# Patient Record
Sex: FEMALE | Race: WHITE | NOT HISPANIC OR LATINO | Employment: UNEMPLOYED | URBAN - METROPOLITAN AREA
[De-identification: names, ages, dates, MRNs, and addresses within clinical notes are randomized per-mention and may not be internally consistent; named-entity substitution may affect disease eponyms.]

---

## 2020-09-02 LAB
EXTERNAL CHLAMYDIA RESULT: NOT DETECTED
EXTERNAL HIV SCREEN: NORMAL
HCV AB SER-ACNC: NON REACTIVE
N GONORRHOEA RRNA SPEC QL PROBE: NOT DETECTED

## 2021-03-29 ENCOUNTER — APPOINTMENT (EMERGENCY)
Dept: RADIOLOGY | Facility: HOSPITAL | Age: 31
End: 2021-03-29
Payer: COMMERCIAL

## 2021-03-29 ENCOUNTER — HOSPITAL ENCOUNTER (EMERGENCY)
Facility: HOSPITAL | Age: 31
Discharge: HOME | End: 2021-03-29
Attending: EMERGENCY MEDICINE | Admitting: EMERGENCY MEDICINE
Payer: COMMERCIAL

## 2021-03-29 VITALS
DIASTOLIC BLOOD PRESSURE: 73 MMHG | HEART RATE: 86 BPM | SYSTOLIC BLOOD PRESSURE: 122 MMHG | OXYGEN SATURATION: 99 % | TEMPERATURE: 99 F | RESPIRATION RATE: 20 BRPM

## 2021-03-29 DIAGNOSIS — U07.1 ACUTE BRONCHITIS DUE TO COVID-19 VIRUS: Primary | ICD-10-CM

## 2021-03-29 DIAGNOSIS — J20.8 ACUTE BRONCHITIS DUE TO COVID-19 VIRUS: Primary | ICD-10-CM

## 2021-03-29 PROCEDURE — 99284 EMERGENCY DEPT VISIT MOD MDM: CPT

## 2021-03-29 PROCEDURE — 99282 EMERGENCY DEPT VISIT SF MDM: CPT | Performed by: EMERGENCY MEDICINE

## 2021-03-30 NOTE — ED PROVIDER NOTES
nHistory  Chief Complaint   Patient presents with    Shortness of Breath     pt here from home c/o cough and SOB  Pt tested COVID + on 03/26/2021  Pt has a dx of asthma and state she feels more SOB when going up stairs  History provided by:  Patient and spouse  MARCIEBOBBY  Presenting symptoms: congestion, cough and sore throat    Presenting symptoms: no fever    Severity:  Moderate  Onset quality:  Gradual  Duration:  5 days  Timing:  Constant  Progression:  Unchanged  Chronicity:  New  Relieved by:  None tried  Worsened by:  Nothing  Ineffective treatments:  None tried  Associated symptoms: no headaches, no neck pain and no wheezing        None       Past Medical History:   Diagnosis Date    Asthma        History reviewed  No pertinent surgical history  History reviewed  No pertinent family history  I have reviewed and agree with the history as documented  E-Cigarette/Vaping     E-Cigarette/Vaping Substances     Social History     Tobacco Use    Smoking status: Never Smoker    Smokeless tobacco: Never Used   Substance Use Topics    Alcohol use: Yes     Comment: socially    Drug use: Not Currently       Review of Systems   Constitutional: Negative for activity change, chills, diaphoresis and fever  HENT: Positive for congestion and sore throat  Negative for sinus pressure  Eyes: Negative for pain and visual disturbance  Respiratory: Positive for cough  Negative for chest tightness, shortness of breath, wheezing and stridor  Cardiovascular: Negative for chest pain and palpitations  Gastrointestinal: Negative for abdominal distention, abdominal pain, constipation, diarrhea, nausea and vomiting  Genitourinary: Negative for dysuria and frequency  Musculoskeletal: Negative for neck pain and neck stiffness  Skin: Negative for rash  Neurological: Negative for dizziness, speech difficulty, light-headedness, numbness and headaches  Physical Exam  Physical Exam  Vitals signs reviewed  Constitutional:       General: She is not in acute distress  Appearance: She is well-developed  She is not diaphoretic  HENT:      Head: Normocephalic and atraumatic  Right Ear: External ear normal       Left Ear: External ear normal       Nose: Nose normal    Eyes:      General:         Right eye: No discharge  Left eye: No discharge  Pupils: Pupils are equal, round, and reactive to light  Neck:      Musculoskeletal: Normal range of motion and neck supple  Trachea: No tracheal deviation  Cardiovascular:      Rate and Rhythm: Normal rate and regular rhythm  Heart sounds: Normal heart sounds  No murmur  Pulmonary:      Effort: Pulmonary effort is normal  No respiratory distress  Breath sounds: Normal breath sounds  No stridor  Abdominal:      General: There is no distension  Palpations: Abdomen is soft  Tenderness: There is no abdominal tenderness  There is no guarding or rebound  Musculoskeletal: Normal range of motion  Skin:     General: Skin is warm and dry  Coloration: Skin is not pale  Findings: No erythema  Neurological:      General: No focal deficit present  Mental Status: She is alert and oriented to person, place, and time           Vital Signs  ED Triage Vitals [03/29/21 2218]   Temperature Pulse Respirations Blood Pressure SpO2   99 °F (37 2 °C) 86 20 122/73 99 %      Temp Source Heart Rate Source Patient Position - Orthostatic VS BP Location FiO2 (%)   Oral Monitor Sitting Left arm --      Pain Score       --           Vitals:    03/29/21 2218   BP: 122/73   Pulse: 86   Patient Position - Orthostatic VS: Sitting         Visual Acuity      ED Medications  Medications - No data to display    Diagnostic Studies  Results Reviewed     None                 No orders to display              Procedures  Procedures         ED Course                             SBIRT 20yo+      Most Recent Value   SBIRT (22 yo +)   In order to provide better care to our patients, we are screening all of our patients for alcohol and drug use  Would it be okay to ask you these screening questions? No Filed at: 03/29/2021 2221                    MDM  Number of Diagnoses or Management Options  Acute bronchitis due to COVID-19 virus: new and requires workup  Diagnosis management comments: 28-year-old female, diagnosed with COVID, no respiratory distress speaking full clear sentences  , no signs for serious illness  , strict return parameters given  Will discharge  Amount and/or Complexity of Data Reviewed  Decide to obtain previous medical records or to obtain history from someone other than the patient: yes  Obtain history from someone other than the patient: yes  Review and summarize past medical records: yes        Disposition  Final diagnoses:   Acute bronchitis due to COVID-19 virus     Time reflects when diagnosis was documented in both MDM as applicable and the Disposition within this note     Time User Action Codes Description Comment    3/29/2021 10:32 PM Olivia Jo Add [U07 1,  J20 8] Acute bronchitis due to COVID-19 virus       ED Disposition     ED Disposition Condition Date/Time Comment    Discharge Stable Mon Mar 29, 2021 10:32 PM Flavio Ayala discharge to home/self care  Follow-up Information    None         There are no discharge medications for this patient  No discharge procedures on file      PDMP Review     None          ED Provider  Electronically Signed by           Luís Frias DO  03/29/21 5946

## 2021-06-30 ENCOUNTER — TELEMEDICINE (OUTPATIENT)
Dept: OBGYN CLINIC | Facility: CLINIC | Age: 31
End: 2021-06-30

## 2021-06-30 VITALS — WEIGHT: 194 LBS | HEIGHT: 65 IN | BODY MASS INDEX: 32.32 KG/M2

## 2021-06-30 DIAGNOSIS — Z31.83 ENCOUNTER FOR ASSISTED REPRODUCTIVE FERTILITY CYCLE: ICD-10-CM

## 2021-06-30 DIAGNOSIS — O09.811 ENCOUNTER FOR SUPERVISION OF PREGNANCY RESULTING FROM ASSISTED REPRODUCTIVE TECHNOLOGY IN FIRST TRIMESTER: Primary | ICD-10-CM

## 2021-06-30 DIAGNOSIS — Z87.42 HISTORY OF PCOS: ICD-10-CM

## 2021-06-30 DIAGNOSIS — O09.819 ENCOUNTER FOR SUPERVISION OF PREGNANCY RESULTING FROM ASSISTED REPRODUCTIVE TECHNOLOGY: ICD-10-CM

## 2021-06-30 PROBLEM — E66.09 CLASS 1 OBESITY DUE TO EXCESS CALORIES WITH BODY MASS INDEX (BMI) OF 32.0 TO 32.9 IN ADULT: Status: ACTIVE | Noted: 2021-06-30

## 2021-06-30 PROBLEM — E66.811 CLASS 1 OBESITY DUE TO EXCESS CALORIES WITH BODY MASS INDEX (BMI) OF 32.0 TO 32.9 IN ADULT: Status: ACTIVE | Noted: 2021-06-30

## 2021-06-30 PROBLEM — E28.2 PCOS (POLYCYSTIC OVARIAN SYNDROME): Status: ACTIVE | Noted: 2019-02-06

## 2021-06-30 PROCEDURE — NOBC: Performed by: OBSTETRICS & GYNECOLOGY

## 2021-06-30 RX ORDER — PROGESTERONE 200 MG/1
1 CAPSULE ORAL DAILY
COMMUNITY
End: 2021-10-06

## 2021-06-30 RX ORDER — MELATONIN
1000 DAILY
COMMUNITY
End: 2022-02-17

## 2021-06-30 NOTE — PROGRESS NOTES
OB INTAKE INTERVIEW  Patient is 30 y o y o  who presents for OB intake at 10s4d wks  She is accompanied by: Sarahradhamark Christnia  The father of her baby Temi Agustin  involved in the pregnancy and is  years old    Last Menstrual Period: December  Ultrasound: Measured  weeks 8w 5days on 21  Estimated Date of Delivery:  2022 calculated by US done on  (Reproductive Medicine)  Signs/Symptoms of Pregnancy  Current pregnancy symptoms: Mild fatigue  Constipation no  Headaches no  Cramping/spotting no  PICA cravings no     Diabetes-  Body mass index is 32 28 kg/m²  If patient has 1 or more, please order early 1 hour GTT  History of GDM no  BMI >35 no  History of PCOS or current metformin use Yes  History of LGA/macrosomic infant (4000g/9lbs) no    If patient has 2 or more, please order early 1 hour GTT  BMI>30 Yes  AMA no  First degree relative with type 2 diabetes no  History of chronic HTN, hyperlipidemia, elevated A1C no  High risk race (, , ,  or ) no    Hypertension- if you answer yes, please order preeclampsia labs (cbc, comprehensive metabolic panel, urine protein creatinine ratio, uric acid)  History of of chronic HTN no  History of gestational HTN no  History of preeclampsia, eclampsia, or HELLP syndrome no  History of diabetes no  History of lupus, autoimmune disease, kidney disease no    Thyroid- if yes order TSH with reflex T4  History of thyroid disease no    Bleeding Disorder or Hx of DVT-patient or first degree relative with history of  Order the following if not done previously  (Factor V, antithrombin III, prothrombin gene mutation, protein C and S Ag, lupus anticoagulant, anticardiolipin, beta-2 glycoprotein)   no    OB/GYN-  History of abnormal pap smear no  History of HPV no  History of Herpes/HSV YES  Last episode 1 year ago  Has script for Valtrex  Aware will need to take prophylacticly  piror to delivery    History of other STI (gonorrhea, chlamydia, trich) Genital warts  History of prior  no  History of prior  no  History of  delivery prior to 36 weeks 6 days no  History of blood transfusion no  Ok for blood transfusion YES    Substance screening- if yes outside of tobacco for her or anyone in her home-order urine drug screen  History of tobacco use no  Currently using tobacco no  Currently using alcohol no  Presently using drugs no  Past drug use  no  IV drug use-If yes add Hep C antibody to labs no  Partner drug use no  Parent/Family drug use no    MRSA Screening-   Does the pt have a hx of MRSA? no  If yes- please follow MRSA protocol and obtain a nasal swab for MRSA culture    Immunizations:  Influenza vaccine given this season Yes  Discussed Tdap vaccine Yes  Discussed COVID Vaccine Yes  Patient declines at this time  Genetic/MFM-  Do you or your partner have a history of any of the following in yourselves or first degree relatives? Cystic fibrosis no  Spinal muscular atrophy no  Hemoglobinopathy/Sickle Cell/Thalassemia no  Fragile X Intellectual Disability no    If yes, discuss carrier screening and recommend consultation with MFM/genetic counseling  If no, discuss option for carrier screening and/or genetic testing with Nuchal Ultrasound  Patient interested Yes  Appointment at [place] [phone] made Patient to call    Interview education  [facility] Pregnancy Essentials Book reviewed and discussed Yes  Pregnancy 11 to 14 weeks included in AVS     Nurse/Family Partnership- patient may qualify No; referral placed No    Prenatal lab work scripts Yes  Extra labs ordered:  No    The patient has a history now or in prior pregnancy notable for: Pregnancy result of Reproductive assistance  (Medications)        Details that I feel the provider should be aware of: Pregnancy results of reproductive assistance  Pregnancy was planned and welcomed by Augustus Jones and her  Roslyn Bill   Patient states she has PCOS which was overlooked for 10 years  Pregnancy is a result of reproductive assistance  (Zonal F, Menopure,Letrozole and Leuprolide)  Patient and  are excited  Feeling great  Has two trips planned, [place] and [facility]  Advised to get up and move legs periodically and empty bladder while flying, stay hydrated  Verbalizes understanding  PN1 visit scheduled  The patient was oriented to our practice, reviewed delivering physicians and [facility]  Reviewed restrictions in place for Covid  Encouraged use of My Chart however advised not to use for urgent pregnancy concerns  This was a telephone intake which lasted approximately 60 minutes  All questions were answered  To receive Ob-packet at next visit      Interviewed by: Michael Caballero

## 2021-06-30 NOTE — PATIENT INSTRUCTIONS
Pregnancy at [address] to [address]:   You are now at the end of your first trimester and entering your second trimester  Morning sickness usually goes away by this time  You may have other symptoms such as fatigue, frequent urination, and headaches  You may have gained 2 to 4 pounds by now  DISCHARGE INSTRUCTIONS:   Return to the emergency department if:   · You have pain or cramping in your abdomen or low back  · You have heavy vaginal bleeding or clotting  · You pass material that looks like tissue or large clots  Collect the material and bring it with you  Call your doctor or obstetrician if:   · You cannot keep food or drinks down, and you are losing weight  · You have light bleeding  · You have chills or a fever  · You have vaginal itching, burning, or pain  · You have yellow, green, white, or foul-smelling vaginal discharge  · You have pain or burning when you urinate, less urine than usual, or pink or bloody urine  · You have questions or concerns about your condition or care  How to care for yourself at this stage of your pregnancy:   · Get plenty of rest   You may feel more tired than normal  You may need to take naps or go to bed earlier  · Manage nausea and vomiting  Avoid fatty and spicy foods  Eat small meals throughout the day instead of large meals  Steph may help to decrease nausea  Ask your healthcare provider about other ways of decreasing nausea and vomiting  · Eat a variety of healthy foods  Healthy foods include fruits, vegetables, whole-grain breads, low-fat dairy foods, beans, lean meats, and fish  Drink liquids as directed  Ask how much liquid to drink each day and which liquids are best for you  Limit caffeine to less than 200 milligrams each day  Limit your intake of fish to 2 servings each week  Choose fish low in mercury such as canned light tuna, shrimp, salmon, cod, or tilapia   Do not  eat fish high in mercury such as swordfish, tilefish, robbin mackerel, and shark  · Take prenatal vitamins as directed  Your need for certain vitamins and minerals, such as folic acid, increases during pregnancy  Prenatal vitamins provide some of the extra vitamins and minerals you need  Prenatal vitamins may also help to decrease the risk of certain birth defects  · Do not smoke  Smoking increases your risk of a miscarriage and other health problems during your pregnancy  Smoking can cause your baby to be born too early or weigh less at birth  Ask your healthcare provider for information if you need help quitting  · Do not drink alcohol  Alcohol passes from your body to your baby through the placenta  It can affect your baby's brain development and cause fetal alcohol syndrome (FAS)  FAS is a group of conditions that causes mental, behavior, and growth problems  · Talk to your healthcare provider before you take any medicines  Many medicines may harm your baby if you take them when you are pregnant  Do not take any medicines, vitamins, herbs, or supplements without first talking to your healthcare provider  Never use illegal or street drugs (such as marijuana or cocaine) while you are pregnant  Safety tips during pregnancy:   · Avoid hot tubs and saunas  Do not use a hot tub or sauna while you are pregnant, especially during your first trimester  Hot tubs and saunas may raise your baby's temperature and increase the risk of birth defects  · Avoid toxoplasmosis  This is an infection caused by eating raw meat or being around infected cat feces  It can cause birth defects, miscarriages, and other problems  Wash your hands after you touch raw meat  Make sure any meat is well-cooked before you eat it  Avoid raw eggs and unpasteurized milk  Use gloves or ask someone else to clean your cat's litter box while you are pregnant  Changes happening with your baby: Your baby has fully formed fingernails and toenails   Your baby's heartbeat can now be heard  Ask your healthcare provider if you can listen to your baby's heartbeat  By week 14, your baby is over 4 inches long from the top of the head to the rump (baby's bottom)  Your baby weighs over 3 ounces  Prenatal care:  Prenatal care is a series of visits with your healthcare provider throughout your pregnancy  During the first 28 weeks of your pregnancy, you will see your healthcare provider 1 time each month  Prenatal care can help prevent problems during pregnancy and childbirth  Your healthcare provider will check your blood pressure and weight  Your baby's heart rate will also be checked  You may also need the following at some visits:  · A pelvic exam  allows your healthcare provider to see your cervix (the bottom part of your uterus)  Your healthcare provider will use a speculum to open your vagina  He or she will check the size and shape of your uterus  · Blood tests  may be done to check for any of the following:     ? Gestational diabetes or anemia (low iron level)    ? Blood type or Rh factor, or certain birth defects    ? Immunity to certain diseases, such as chickenpox or rubella    ? An infection, such as a sexually transmitted infection, HIV, or hepatitis B    · Hepatitis B  may need to be prevented or treated  Hepatitis B is inflammation of the liver caused by the hepatitis B virus (HBV)  HBV can spread from a mother to her baby during delivery  You will be checked for HBV as early as possible in the first trimester of each pregnancy  You need the test even if you received the hepatitis B vaccine or were tested before  You may need to have an HBV infection treated before you give birth  · Urine tests  may also be done to check for sugar and protein  These can be signs of gestational diabetes or preeclampsia  Urine tests may also be done to check for signs of infection  · A fetal ultrasound  shows pictures of your baby inside your uterus   The pictures are used to check your baby's development, movement, and position  · Genetic disorder screening tests  may be offered to you  These tests check your baby's risk for genetic disorders such as Down syndrome  A screening test includes a blood test and ultrasound  Follow up with your doctor or obstetrician as directed:  Go to all prenatal visits  Write down your questions so you remember to ask them during your visits  © Copyright [address] Information is for End User's use only and may not be sold, redistributed or otherwise used for commercial purposes  All illustrations and images included in CareNotes® are the copyrighted property of [employer]  or [address] [place], [place] [address]   The above information is an  only  It is not intended as medical advice for individual conditions or treatments  Talk to your doctor, nurse or pharmacist before following any medical regimen to see if it is safe and effective for you

## 2021-07-15 ENCOUNTER — INITIAL PRENATAL (OUTPATIENT)
Dept: OBGYN CLINIC | Facility: CLINIC | Age: 31
End: 2021-07-15

## 2021-07-15 ENCOUNTER — ROUTINE PRENATAL (OUTPATIENT)
Dept: PERINATAL CARE | Facility: OTHER | Age: 31
End: 2021-07-15
Payer: COMMERCIAL

## 2021-07-15 VITALS — SYSTOLIC BLOOD PRESSURE: 110 MMHG | WEIGHT: 198.8 LBS | BODY MASS INDEX: 33.08 KG/M2 | DIASTOLIC BLOOD PRESSURE: 74 MMHG

## 2021-07-15 VITALS
WEIGHT: 199.4 LBS | BODY MASS INDEX: 33.22 KG/M2 | SYSTOLIC BLOOD PRESSURE: 114 MMHG | DIASTOLIC BLOOD PRESSURE: 77 MMHG | HEIGHT: 65 IN | HEART RATE: 73 BPM

## 2021-07-15 DIAGNOSIS — Z3A.12 12 WEEKS GESTATION OF PREGNANCY: ICD-10-CM

## 2021-07-15 DIAGNOSIS — J45.909 ASTHMA, UNSPECIFIED ASTHMA SEVERITY, UNSPECIFIED WHETHER COMPLICATED, UNSPECIFIED WHETHER PERSISTENT: ICD-10-CM

## 2021-07-15 DIAGNOSIS — Z34.01 FIRST PREGNANCY, FIRST TRIMESTER: Primary | ICD-10-CM

## 2021-07-15 DIAGNOSIS — B00.9 HERPES: ICD-10-CM

## 2021-07-15 DIAGNOSIS — Z36.82 ENCOUNTER FOR NUCHAL TRANSLUCENCY TESTING: Primary | ICD-10-CM

## 2021-07-15 DIAGNOSIS — O09.819 ENCOUNTER FOR SUPERVISION OF PREGNANCY RESULTING FROM ASSISTED REPRODUCTIVE TECHNOLOGY: ICD-10-CM

## 2021-07-15 DIAGNOSIS — E66.09 CLASS 1 OBESITY DUE TO EXCESS CALORIES WITH BODY MASS INDEX (BMI) OF 32.0 TO 32.9 IN ADULT, UNSPECIFIED WHETHER SERIOUS COMORBIDITY PRESENT: ICD-10-CM

## 2021-07-15 LAB
SL AMB  POCT GLUCOSE, UA: NORMAL
SL AMB POCT URINE PROTEIN: NORMAL

## 2021-07-15 PROCEDURE — 99241 PR OFFICE CONSULTATION NEW/ESTAB PATIENT 15 MIN: CPT | Performed by: OBSTETRICS & GYNECOLOGY

## 2021-07-15 PROCEDURE — 76813 OB US NUCHAL MEAS 1 GEST: CPT | Performed by: OBSTETRICS & GYNECOLOGY

## 2021-07-15 PROCEDURE — 87591 N.GONORRHOEAE DNA AMP PROB: CPT | Performed by: STUDENT IN AN ORGANIZED HEALTH CARE EDUCATION/TRAINING PROGRAM

## 2021-07-15 PROCEDURE — 87491 CHLMYD TRACH DNA AMP PROBE: CPT | Performed by: STUDENT IN AN ORGANIZED HEALTH CARE EDUCATION/TRAINING PROGRAM

## 2021-07-15 PROCEDURE — PNV: Performed by: STUDENT IN AN ORGANIZED HEALTH CARE EDUCATION/TRAINING PROGRAM

## 2021-07-15 RX ORDER — ASPIRIN 81 MG/1
162 TABLET ORAL DAILY
Qty: 180 TABLET | Refills: 3 | Status: SHIPPED | OUTPATIENT
Start: 2021-07-15 | End: 2021-12-28 | Stop reason: ALTCHOICE

## 2021-07-15 NOTE — ASSESSMENT & PLAN NOTE
Last outbreak approx 1 year prior  Has Valtrex at home in case of acute outbreak  Reviewed prophylaxis starting at 36 weeks, if prodromal or with lesions at time of labor would be for primary  rather than allowing

## 2021-07-15 NOTE — LETTER
July 15, 2021     Johnson Meyers, [address]    Patient: Tanvir Teresa   YOB: 1990   Date of Visit: 7/15/2021       Dear Dr Julia Espinoza: Thank you for referring Teo Eastman to me for evaluation  Below are my notes for this consultation  If you have questions, please do not hesitate to call me  I look forward to following your patient along with you  Sincerely,        Charlie Zuniga MD        CC: No Recipients  Charlie Zuniga MD  7/15/2021  8:38 AM  Sign when Signing Visit  CONSULT NOTE    Johnson Meyers MD  [address]     Thank you for referring your Tanvir Teresa for a Maternal-Fetal Medicine Consultation:  Below is my consultation  Thank you very much for requesting a consultation on this very nice patient for the indication of genetic screening  This is the patient's 1st pregnancy  She has a history of PCOS and asthma  She worked with infertility to help get pregnant and is currently on Prometrium 200 mg daily  She will stop this this week  She has an allergy to sulfa medications  She has no significant substance use or family history  A review of systems is otherwise negative  We discussed the options for genetic screening, including but not limited to first trimester screening, second trimester screening, combined first and second trimester screening, noninvasive prenatal screening (NIPS) for patients at high risk and diagnostic screening through the use of CVS and amniocentesis    We discussed the risks and benefits of each approach including the sensitivities and false positive rates as well as the difference between a screening test and a diagnostic test   At the conclusion of our discussion the patient elected noninvasive prenatal screening utilizing the MaterniT 21 plus test   The patient was given a requisition and collection kit to have this performed at the lab  The results should be available in approximately 7-10 days  The implications of obesity and pregnancy are significant  The level of obesity is directly related to the risk of adverse pregnancy outcomes including but not limited to, risk of diabetes, hypertensive disorders of pregnancy, macrosomia, intrauterine growth restriction, labor and shoulder dystocias,  section, and increased risk of stillbirth  Recommend discussing the current weight gain recommendations for women with obesity and discussing good dietary practices as well as the safety of exercise in pregnancy  I recommend the patient gain no more than 11-20 pounds throughout her entire pregnancy, increase her exercise and follow healthy dietary habits  Consider referral to a dietitian should the patient have difficulty following the aforementioned recommendations  Recommend third trimester growth ultrasounds to screen for fetal growth problems as well as ensuring the patient is appropriately screened for pregestational and gestational diabetes  Additional  surveillance is recommended starting at 36 weeks in those women with a BMI of greater than 40 given the increased risk for stillbirth  Given the patient's history of nulliparity and BMI, I recommend initiating low dose aspirin therapy  A recent meta-analysis yielded risk reductions of 24% for preeclampsia, 20% for intrauterine growth restriction, and 14% for  birth, with an absolute risk reduction of 2-5% for preeclampsia, one to 5% for intrauterine growth restriction, and 2-4% for  birth  In this study, there was no identified risk of harm to the mother or fetus but long-term evidence was somewhat limited  Given the overall safety profile and risk-benefit analysis, I recommend 162 mg of aspirin be taken Daily and discontinued at around 36 weeks gestation or 2-3 weeks prior to planned delivery     I reviewed these recommendations with the patient and answered all of her questions to apparent satisfaction  We discussed that SARS-CoV-2 vaccination is an option for eligible pregnant and lactating women  We discussed that the [place] and Ludy Garnett mRNA vaccines have not been tested in pregnant women and breastfeeding women yet but that the mRNA vaccines have no live virus and do not contain ingredients that are known to be harmful to pregnant women or to the fetus  We discussed that pregnant women are at increased risk of  more severe COVID-19 disease then the general population and that the mRNA vaccines are thought to be highly effective at preventing the disease  Animal studies of the Moderna vaccine demonstrated no significant negative effects on female fertility or fetal development  We discussed some risks of the vaccine including injection site reactions, fatigue, headache, myalgias, chills, and fever; fever can be treated with acetaminophen  ACOG and [facility] recommends that pregnant women have access to the vaccine and that the vaccine not be withheld from pregnant women  Pregnant women are encouraged to speak with their obstetric provider about their own personal choice regarding the vaccine  An [facility]-endorsed COVID-19 Vaccine in Pregnancy Decision Aid was attached to her AVS and can be located at [email or web address]       We discussed follow-up in detail and I recommend an anatomy ultrasound be scheduled for 20 weeks gestation  Thank you very much for allowing us to participate in the care of this very nice patient  Should you have any questions, please do not hesitate to contact our office  Please note, in addition to the time spent discussing the results of the ultrasound, I spent approximately 15 minutes of face-to-face time with the patient, greater than 50% of which was spent in counseling and the coordination of care for this patient      Portions of the record may have been created with voice recognition software  Occasional wrong word or "sound a like" substitutions may have occurred due to the inherent limitations of voice recognition software  Read the chart carefully and recognize, using context, where substitutions have occurred  Kishor Doyle MD  Attending Physician, [place]

## 2021-07-15 NOTE — PROGRESS NOTES
Patient presents for Pn1 visit    LMP-  LE-2022  GA-12W 5D    Urine-Neg  Last pap-4/15/19 Neg  GC Swab to be collected today  Pn1 labs NOT completed  Blue folder given at today's visit and thoroughly reviewed  No LOF, bleeding, cramping or discharge  No questions or concerns for today's visit

## 2021-07-15 NOTE — ASSESSMENT & PLAN NOTE
Pregravid BMI 32, goal for pregnancy 11-20   TWG to date 4#  Reviewed daily walks after lunch with goal 30 minutes five times per week as well as avoiding sugary drinks, snacks, and refined carbohydrates  Plan for early 1 hr, pending

## 2021-07-15 NOTE — PROGRESS NOTES
CONSULT NOTE    Arin Culp MD  [address]  [address]     Thank you for referring your Earney Severe for a Maternal-Fetal Medicine Consultation:  Below is my consultation  Thank you very much for requesting a consultation on this very nice patient for the indication of genetic screening  This is the patient's 1st pregnancy  She has a history of PCOS and asthma  She worked with infertility to help get pregnant and is currently on Prometrium 200 mg daily  She will stop this this week  She has an allergy to sulfa medications  She has no significant substance use or family history  A review of systems is otherwise negative  We discussed the options for genetic screening, including but not limited to first trimester screening, second trimester screening, combined first and second trimester screening, noninvasive prenatal screening (NIPS) for patients at high risk and diagnostic screening through the use of CVS and amniocentesis  We discussed the risks and benefits of each approach including the sensitivities and false positive rates as well as the difference between a screening test and a diagnostic test   At the conclusion of our discussion the patient elected noninvasive prenatal screening utilizing the MaterniT 21 plus test   The patient was given a requisition and collection kit to have this performed at the lab  The results should be available in approximately 7-10 days  The implications of obesity and pregnancy are significant  The level of obesity is directly related to the risk of adverse pregnancy outcomes including but not limited to, risk of diabetes, hypertensive disorders of pregnancy, macrosomia, intrauterine growth restriction, labor and shoulder dystocias,  section, and increased risk of stillbirth    Recommend discussing the current weight gain recommendations for women with obesity and discussing good dietary practices as well as the safety of exercise in pregnancy  I recommend the patient gain no more than 11-20 pounds throughout her entire pregnancy, increase her exercise and follow healthy dietary habits  Consider referral to a dietitian should the patient have difficulty following the aforementioned recommendations  Recommend third trimester growth ultrasounds to screen for fetal growth problems as well as ensuring the patient is appropriately screened for pregestational and gestational diabetes  Additional  surveillance is recommended starting at 36 weeks in those women with a BMI of greater than 40 given the increased risk for stillbirth  Given the patient's history of nulliparity and BMI, I recommend initiating low dose aspirin therapy  A recent meta-analysis yielded risk reductions of 24% for preeclampsia, 20% for intrauterine growth restriction, and 14% for  birth, with an absolute risk reduction of 2-5% for preeclampsia, one to 5% for intrauterine growth restriction, and 2-4% for  birth  In this study, there was no identified risk of harm to the mother or fetus but long-term evidence was somewhat limited  Given the overall safety profile and risk-benefit analysis, I recommend 162 mg of aspirin be taken Daily and discontinued at around 36 weeks gestation or 2-3 weeks prior to planned delivery  I reviewed these recommendations with the patient and answered all of her questions to apparent satisfaction  We discussed that SARS-CoV-2 vaccination is an option for eligible pregnant and lactating women  We discussed that the Pappas Peter and Allison Sobia mRNA vaccines have not been tested in pregnant women and breastfeeding women yet but that the mRNA vaccines have no live virus and do not contain ingredients that are known to be harmful to pregnant women or to the fetus    We discussed that pregnant women are at increased risk of  more severe COVID-19 disease then the general population and that the mRNA vaccines are thought to be highly effective at preventing the disease  Animal studies of the Moderna vaccine demonstrated no significant negative effects on female fertility or fetal development  We discussed some risks of the vaccine including injection site reactions, fatigue, headache, myalgias, chills, and fever; fever can be treated with acetaminophen  ACOG and [facility] recommends that pregnant women have access to the vaccine and that the vaccine not be withheld from pregnant women  Pregnant women are encouraged to speak with their obstetric provider about their own personal choice regarding the vaccine  An [facility]-endorsed COVID-19 Vaccine in Pregnancy Decision Aid was attached to her AVS and can be located at [email or web address]       We discussed follow-up in detail and I recommend an anatomy ultrasound be scheduled for 20 weeks gestation  Thank you very much for allowing us to participate in the care of this very nice patient  Should you have any questions, please do not hesitate to contact our office  Please note, in addition to the time spent discussing the results of the ultrasound, I spent approximately 15 minutes of face-to-face time with the patient, greater than 50% of which was spent in counseling and the coordination of care for this patient  Portions of the record may have been created with voice recognition software  Occasional wrong word or "sound a like" substitutions may have occurred due to the inherent limitations of voice recognition software  Read the chart carefully and recognize, using context, where substitutions have occurred  Kishor Genao MD  Attending Physician, [place]

## 2021-07-15 NOTE — PROGRESS NOTES
Patient chose to have [address] screen  Instructed patient on process for checking her OOP cost via [employer] [facility]  Provided [id number] instruction card toll free # [phone]  Patient made aware if [id number]  unable to give an estimate she will need to contact M office prior to blood draw  Patient aware that  is provided by third party and is only an estimate of cost not a guarantee  Insurance may require prior authorization, if test drawn without prior authorization she will be responsible for full cost of test   For definitive OOP cost, lab deductible or if lab authorization is required patient encouraged to call her insurance provider  Explained customer service insurance phone # located on the back of her ID card  Maternal Fetal Medicine will have results in approximately 7-10 business days and will call patient or notify via [address]  Patient aware viewing lab result reveals gender  **[id number] lab kit with prepaid [employer]  given to patient  Patient verbalized understanding of all instructions and no questions at this time

## 2021-07-15 NOTE — PROGRESS NOTES
INITIAL OB APPOINTMENT    Assessment/Plan   31 y o   12w5d for initial prenatal appointment  Problem List Items Addressed This Visit        Respiratory    Asthma     Has rescue inhaler in purse            Other    Class 1 obesity due to excess calories with body mass index (BMI) of 32 0 to 32 9 in adult     Pregravid BMI 32, goal for pregnancy 11-20  TWG to date 4#  Reviewed daily walks after lunch with goal 30 minutes five times per week as well as avoiding sugary drinks, snacks, and refined carbohydrates  Plan for early 1 hr, pending         Herpes     Last outbreak approx 1 year prior  Has Valtrex at home in case of acute outbreak  Reviewed prophylaxis starting at 36 weeks, if prodromal or with lesions at time of labor would be for primary  rather than allowing          First pregnancy, first trimester - Primary     Prenatal labs pending  Plan to wrap up Prometrium  To start baby ASA    Plan to attempt breast feeding, bottle if needs  Would like NuvaRing for contraception - previously did well on this         Relevant Orders    Chlamydia/GC amplified DNA by PCR    POCT urine dip (Completed)         For routine OB follow up   ------------    CC: initial prenatal appointment    History of Present Illness     Rosa Hernandez is a 31 y o  female  at 14w10d Estimated Date of Delivery: 22 by 8 5/7 week ultrasound on 2021, not consistent with LMP (No LMP recorded  Patient is pregnant  )  She presents for her initial prenatal appointment  She is accompanied by her mom  Concerns today: none, just had MFM appointment    PRENATAL ISSUES  1  BMI 32  2   Asthma  3      OB History        1    Para        Term                AB        Living           SAB        TAB        Ectopic        Multiple        Live Births                   # 1 - Date: None, Sex: None, Weight: None, GA: None, Delivery: None, Apgar1: None, Apgar5: None, Living: None, Birth Comments: None      GYN History  No LMP recorded  Patient is pregnant  Remote history abnormal Pap smears - last Pap smear 4/2019 NILM  No history of cryotherapy, LEEP, or cold knife cone of the cervix  History herpes    Past Medical History:   Diagnosis Date    Abnormal Pap smear of cervix     Asthma     albuterol as needed    Genital warts     Spouse has history    Herpes     last outbreak 1 year ago     Past Surgical History:   Procedure Laterality Date    WISDOM TOOTH EXTRACTION       No alcohol, tobacco, illicit drug use  Current Outpatient Medications:     aspirin (ECOTRIN LOW STRENGTH) 81 mg EC tablet, Take 2 tablets (162 mg total) by mouth daily, Disp: 180 tablet, Rfl: 3    cholecalciferol (VITAMIN D3) 1,000 units tablet, Take 1,000 Units by mouth daily, Disp: , Rfl:     Prenatal MV & Min w/FA-DHA (ONE A DAY PRENATAL PO), Take 1 tablet by mouth, Disp: , Rfl:     Progesterone 200 MG CAPS, Take 1 tablet by mouth daily, Disp: , Rfl:   Allergies   Allergen Reactions    Sulfa Antibiotics Hives       Objective   Vitals: Blood pressure 110/74, weight 90 2 kg (198 lb 12 8 oz)  Body mass index is 33 08 kg/m²  General: NAD, AAOx3  Neck: supple, no thyromegaly or thyroid nodules appreciated  Heart: RRR  Lungs: CTAB  Breast: nipples everted bilaterally, no skin changes  No dimpling, redness, or erythema  No breast masses or axillary masses bilaterally  Dense breasts bilaterally  Abdomen: soft, non-distended, non tender to palpation  Extremities: non-tender to palpation  Speculum exam: Normal appearing external genitalia, normal hair distribution  Urethra well-suspended, no clitoromegaly noted  Vagina pink, moist, well-rugated  Cervix without lesion, nulliparous appearing  no dilated  no blood in vaginal vault  Pelvic exam: No cervical motion tenderness  No adnexal masses or tenderness  anteverted uterus, 12 week size  Fetal heart rate 160 bpm by M mode

## 2021-07-15 NOTE — PATIENT INSTRUCTIONS
For a COVID-19 Vaccine in Pregnancy Decision Aid, go to [email or web address]    The ABM (Academy of Breastfeeding Medicine) Statement on Considerations for COVID-19 Vaccination in Lactation is available at: TravelLesson tn  Finally, the CDC statement on Vaccination Consideration for People who are Pregnant or Breastfeeding is available at:  Grant gomes      Here are the images (12/28/20) for the decision aid:

## 2021-07-15 NOTE — ASSESSMENT & PLAN NOTE
Prenatal labs pending  Plan to wrap up Prometrium  To start baby ASA    Plan to attempt breast feeding, bottle if needs  Would like NuvaRing for contraception - previously did well on this

## 2021-07-17 LAB
C TRACH DNA SPEC QL NAA+PROBE: NEGATIVE
N GONORRHOEA DNA SPEC QL NAA+PROBE: NEGATIVE

## 2021-07-21 LAB
CFDNA.FET/CFDNA.TOTAL SFR FETUS: NORMAL %
CFDNA.FET/CFDNA.TOTAL SFR FETUS: NORMAL %
CITATION REF LAB TEST: NORMAL
FET 13+18+21+X+Y ANEUP PLAS.CFDNA: NEGATIVE
FET CHR 21 TS PLAS.CFDNA QL: NEGATIVE
FET CHR 21 TS PLAS.CFDNA QL: NEGATIVE
FET MS X RISK WBC.DNA+CFDNA QL: NOT DETECTED
FET SEX PLAS.CFDNA DOSAGE CFDNA: NORMAL
FET TS 13 RISK PLAS.CFDNA QL: NEGATIVE
FET X + Y ANEUP RISK PLAS.CFDNA SEQ-IMP: NOT DETECTED
GESTATION: NORMAL
GESTATIONAL AGE > 9:: YES
LAB DIRECTOR NAME PROVIDER: NORMAL
LABORATORY COMMENT REPORT: NORMAL
LIMITATIONS OF THE TEST: NORMAL
NEGATIVE PREDICTIVE VALUE: NORMAL
PERFORMANCE CHARACTERISTICS: NORMAL
POSITIVE PREDICTIVE VALUE: NORMAL
REF LAB TEST METHOD: NORMAL
SL AMB NOTE:: NORMAL
TEST PERFORMANCE INFO SPEC: NORMAL

## 2021-07-26 NOTE — RESULT ENCOUNTER NOTE
I have reviewed the results of the NIPS which are low risk  Please call patient and notify her of reassuring results if she has not viewed on MyChart  Please ensure she is notified of recommendation of MSAFP to be ordered and followed up through her primary Obstetrician's office  Thank you

## 2021-08-13 ENCOUNTER — ROUTINE PRENATAL (OUTPATIENT)
Dept: OBGYN CLINIC | Facility: CLINIC | Age: 31
End: 2021-08-13

## 2021-08-13 VITALS — SYSTOLIC BLOOD PRESSURE: 100 MMHG | WEIGHT: 202.4 LBS | DIASTOLIC BLOOD PRESSURE: 78 MMHG | BODY MASS INDEX: 33.68 KG/M2

## 2021-08-13 DIAGNOSIS — Z33.1 PREGNANT STATE, INCIDENTAL: ICD-10-CM

## 2021-08-13 DIAGNOSIS — O09.819 ENCOUNTER FOR SUPERVISION OF PREGNANCY RESULTING FROM ASSISTED REPRODUCTIVE TECHNOLOGY: Primary | ICD-10-CM

## 2021-08-13 DIAGNOSIS — Z36.9 UNSPECIFIED ANTENATAL SCREENING: ICD-10-CM

## 2021-08-13 LAB
SL AMB  POCT GLUCOSE, UA: NORMAL
SL AMB POCT URINE PROTEIN: NORMAL

## 2021-08-13 PROCEDURE — PNV: Performed by: STUDENT IN AN ORGANIZED HEALTH CARE EDUCATION/TRAINING PROGRAM

## 2021-08-13 NOTE — PROGRESS NOTES
31 y o   at [place] [address], here for return OB visit  Feeling well overall and without concerns  Does not yet feel FM  No questions/concerns       Problem List Items Addressed This Visit     None      Visit Diagnoses     Encounter for supervision of pregnancy resulting from assisted reproductive technology    -  Primary    Relevant Orders    Alpha fetoprotein, maternal    POCT urine dip (Completed)    Type and screen    Pregnant state, incidental        Relevant Orders    Alpha fetoprotein, maternal    Unspecified  screening        Relevant Orders    Alpha fetoprotein, maternal

## 2021-08-13 NOTE — PROGRESS NOTES
Pt is here for routine PN visit  GC/CH neg  Labs completed at [place] [id number] scheduled for 9/11  Denies any VB or cramping  No LOF

## 2021-09-08 LAB
ABO GROUP BLD: NORMAL
BLD GP AB SCN SERPL QL: NEGATIVE
RH BLD: POSITIVE

## 2021-09-09 LAB
2ND TRIMESTER 4 SCREEN SERPL-IMP: NORMAL
AFP ADJ MOM SERPL: 1.48
AFP INTERP AMN-IMP: NORMAL
AFP INTERP SERPL-IMP: NORMAL
AFP INTERP SERPL-IMP: NORMAL
AFP SERPL-MCNC: 75.6 NG/ML
AGE AT DELIVERY: 29.1 YR
GA METHOD: NORMAL
GA: 20.4 WEEKS
IDDM PATIENT QL: NO
MULTIPLE PREGNANCY: NO
NEURAL TUBE DEFECT RISK FETUS: 2886 %

## 2021-09-10 ENCOUNTER — ROUTINE PRENATAL (OUTPATIENT)
Dept: PERINATAL CARE | Facility: OTHER | Age: 31
End: 2021-09-10
Payer: COMMERCIAL

## 2021-09-10 VITALS
DIASTOLIC BLOOD PRESSURE: 78 MMHG | BODY MASS INDEX: 34.99 KG/M2 | HEIGHT: 65 IN | SYSTOLIC BLOOD PRESSURE: 115 MMHG | HEART RATE: 74 BPM | WEIGHT: 210 LBS

## 2021-09-10 DIAGNOSIS — Z3A.20 20 WEEKS GESTATION OF PREGNANCY: ICD-10-CM

## 2021-09-10 DIAGNOSIS — Z36.86 ENCOUNTER FOR ANTENATAL SCREENING FOR CERVICAL LENGTH: ICD-10-CM

## 2021-09-10 DIAGNOSIS — O99.212 MATERNAL OBESITY, ANTEPARTUM, SECOND TRIMESTER: Primary | ICD-10-CM

## 2021-09-10 PROCEDURE — 76811 OB US DETAILED SNGL FETUS: CPT | Performed by: OBSTETRICS & GYNECOLOGY

## 2021-09-10 PROCEDURE — 76817 TRANSVAGINAL US OBSTETRIC: CPT | Performed by: OBSTETRICS & GYNECOLOGY

## 2021-09-10 PROCEDURE — 99213 OFFICE O/P EST LOW 20 MIN: CPT | Performed by: OBSTETRICS & GYNECOLOGY

## 2021-09-10 NOTE — LETTER
September 12, 2021     [facility]  [address]    Patient: Nadege    YOB: 1990   Date of Visit: 9/10/2021       Dear Dr Jemma Bentley: Thank you for referring Nikki Collet to me for evaluation  Below are my notes for this consultation  If you have questions, please do not hesitate to call me  I look forward to following your patient along with you  Sincerely,        Mara Church MD        CC: No Recipients  Mara Church MD  9/10/2021  6:31 AM  Sign when Signing Visit  Please refer to the [facility] ultrasound report in Ob Procedures for additional information regarding today's visit

## 2021-09-14 ENCOUNTER — ROUTINE PRENATAL (OUTPATIENT)
Dept: OBGYN CLINIC | Facility: MEDICAL CENTER | Age: 31
End: 2021-09-14

## 2021-09-14 VITALS — DIASTOLIC BLOOD PRESSURE: 72 MMHG | SYSTOLIC BLOOD PRESSURE: 118 MMHG | WEIGHT: 211.6 LBS | BODY MASS INDEX: 35.21 KG/M2

## 2021-09-14 DIAGNOSIS — Z34.02 ENCOUNTER FOR SUPERVISION OF NORMAL FIRST PREGNANCY, SECOND TRIMESTER: Primary | ICD-10-CM

## 2021-09-14 DIAGNOSIS — E66.09 CLASS 1 OBESITY DUE TO EXCESS CALORIES WITH SERIOUS COMORBIDITY AND BODY MASS INDEX (BMI) OF 32.0 TO 32.9 IN ADULT: ICD-10-CM

## 2021-09-14 LAB
SL AMB  POCT GLUCOSE, UA: NORMAL
SL AMB POCT URINE PROTEIN: NORMAL

## 2021-09-14 PROCEDURE — PNV: Performed by: STUDENT IN AN ORGANIZED HEALTH CARE EDUCATION/TRAINING PROGRAM

## 2021-09-14 RX ORDER — LORATADINE 10 MG/1
10 TABLET ORAL DAILY
COMMUNITY
End: 2021-10-06

## 2021-09-14 NOTE — PROGRESS NOTES
31 y o   at 21w3d presents for routine prenatal visit  She denies contractions/leakage of fluid/vaginal bleeding  She feels good fetal movement  Problem List Items Addressed This Visit        Other    Class 1 obesity due to excess calories with body mass index (BMI) of 32 0 to 32 9 in adult  Weight gain 17# - recommend 11-20  Discussed healthy diet, exercise  She does go to the gym        Other Visit Diagnoses     Encounter for supervision of normal first pregnancy, second trimester    -  Primary  Possible aberrant left subclavian artery - f/u echo scheduled  Growth at 32 wks  NIPT low risk  F/u in 4 wks or prn

## 2021-10-04 ENCOUNTER — TELEPHONE (OUTPATIENT)
Dept: OBGYN CLINIC | Facility: CLINIC | Age: 31
End: 2021-10-04

## 2021-10-04 PROBLEM — Z34.02 FIRST PREGNANCY, SECOND TRIMESTER: Status: ACTIVE | Noted: 2021-07-15

## 2021-10-06 ENCOUNTER — ROUTINE PRENATAL (OUTPATIENT)
Dept: OBGYN CLINIC | Facility: CLINIC | Age: 31
End: 2021-10-06

## 2021-10-06 VITALS — DIASTOLIC BLOOD PRESSURE: 72 MMHG | BODY MASS INDEX: 36.11 KG/M2 | SYSTOLIC BLOOD PRESSURE: 108 MMHG | WEIGHT: 217 LBS

## 2021-10-06 DIAGNOSIS — J45.909 ASTHMA, UNSPECIFIED ASTHMA SEVERITY, UNSPECIFIED WHETHER COMPLICATED, UNSPECIFIED WHETHER PERSISTENT: ICD-10-CM

## 2021-10-06 DIAGNOSIS — B00.9 HERPES: ICD-10-CM

## 2021-10-06 DIAGNOSIS — Z34.02 FIRST PREGNANCY, SECOND TRIMESTER: Primary | ICD-10-CM

## 2021-10-06 DIAGNOSIS — E66.09 CLASS 1 OBESITY DUE TO EXCESS CALORIES WITH BODY MASS INDEX (BMI) OF 32.0 TO 32.9 IN ADULT, UNSPECIFIED WHETHER SERIOUS COMORBIDITY PRESENT: ICD-10-CM

## 2021-10-06 PROCEDURE — PNV: Performed by: STUDENT IN AN ORGANIZED HEALTH CARE EDUCATION/TRAINING PROGRAM

## 2021-10-06 RX ORDER — VALACYCLOVIR HYDROCHLORIDE 500 MG/1
500 TABLET, FILM COATED ORAL 2 TIMES DAILY
Qty: 10 TABLET | Refills: 3 | Status: SHIPPED | OUTPATIENT
Start: 2021-10-06 | End: 2022-01-19 | Stop reason: HOSPADM

## 2021-10-12 ENCOUNTER — ROUTINE PRENATAL (OUTPATIENT)
Dept: PEDIATRIC CARDIOLOGY | Facility: CLINIC | Age: 31
End: 2021-10-12
Payer: COMMERCIAL

## 2021-10-12 VITALS
WEIGHT: 219.36 LBS | HEIGHT: 65 IN | BODY MASS INDEX: 36.55 KG/M2 | DIASTOLIC BLOOD PRESSURE: 83 MMHG | SYSTOLIC BLOOD PRESSURE: 131 MMHG | HEART RATE: 97 BPM

## 2021-10-12 DIAGNOSIS — Z36.89 NORMAL FETAL CARDIAC EXAM: Primary | ICD-10-CM

## 2021-10-12 PROCEDURE — 76825 ECHO EXAM OF FETAL HEART: CPT | Performed by: PEDIATRICS

## 2021-10-12 PROCEDURE — 93325 DOPPLER ECHO COLOR FLOW MAPG: CPT | Performed by: PEDIATRICS

## 2021-10-12 PROCEDURE — 99205 OFFICE O/P NEW HI 60 MIN: CPT | Performed by: PEDIATRICS

## 2021-10-12 PROCEDURE — 76827 ECHO EXAM OF FETAL HEART: CPT | Performed by: PEDIATRICS

## 2021-10-12 PROCEDURE — 76820 UMBILICAL ARTERY ECHO: CPT | Performed by: PEDIATRICS

## 2021-10-13 ENCOUNTER — ROUTINE PRENATAL (OUTPATIENT)
Dept: OBGYN CLINIC | Facility: CLINIC | Age: 31
End: 2021-10-13

## 2021-10-13 VITALS
HEIGHT: 65 IN | DIASTOLIC BLOOD PRESSURE: 70 MMHG | WEIGHT: 218 LBS | BODY MASS INDEX: 36.32 KG/M2 | SYSTOLIC BLOOD PRESSURE: 110 MMHG

## 2021-10-13 DIAGNOSIS — Z34.02 ENCOUNTER FOR SUPERVISION OF NORMAL FIRST PREGNANCY IN SECOND TRIMESTER: Primary | ICD-10-CM

## 2021-10-13 LAB
SL AMB  POCT GLUCOSE, UA: NEGATIVE
SL AMB POCT URINE PROTEIN: NEGATIVE

## 2021-10-13 PROCEDURE — PNV: Performed by: OBSTETRICS & GYNECOLOGY

## 2021-10-26 ENCOUNTER — APPOINTMENT (OUTPATIENT)
Dept: LAB | Facility: CLINIC | Age: 31
End: 2021-10-26
Payer: COMMERCIAL

## 2021-10-26 DIAGNOSIS — Z34.02 FIRST PREGNANCY, SECOND TRIMESTER: ICD-10-CM

## 2021-10-26 LAB
BASOPHILS # BLD AUTO: 0.04 THOUSANDS/ΜL (ref 0–0.1)
BASOPHILS NFR BLD AUTO: 0 % (ref 0–1)
EOSINOPHIL # BLD AUTO: 0.2 THOUSAND/ΜL (ref 0–0.61)
EOSINOPHIL NFR BLD AUTO: 2 % (ref 0–6)
ERYTHROCYTE [DISTWIDTH] IN BLOOD BY AUTOMATED COUNT: 13.1 % (ref 11.6–15.1)
GLUCOSE 1H P 50 G GLC PO SERPL-MCNC: 131 MG/DL (ref 40–134)
HCT VFR BLD AUTO: 34.6 % (ref 34.8–46.1)
HGB BLD-MCNC: 11.5 G/DL (ref 11.5–15.4)
IMM GRANULOCYTES # BLD AUTO: 0.12 THOUSAND/UL (ref 0–0.2)
IMM GRANULOCYTES NFR BLD AUTO: 1 % (ref 0–2)
LYMPHOCYTES # BLD AUTO: 2.64 THOUSANDS/ΜL (ref 0.6–4.47)
LYMPHOCYTES NFR BLD AUTO: 23 % (ref 14–44)
MCH RBC QN AUTO: 32.7 PG (ref 26.8–34.3)
MCHC RBC AUTO-ENTMCNC: 33.2 G/DL (ref 31.4–37.4)
MCV RBC AUTO: 98 FL (ref 82–98)
MONOCYTES # BLD AUTO: 0.74 THOUSAND/ΜL (ref 0.17–1.22)
MONOCYTES NFR BLD AUTO: 6 % (ref 4–12)
NEUTROPHILS # BLD AUTO: 7.89 THOUSANDS/ΜL (ref 1.85–7.62)
NEUTS SEG NFR BLD AUTO: 68 % (ref 43–75)
NRBC BLD AUTO-RTO: 0 /100 WBCS
PLATELET # BLD AUTO: 226 THOUSANDS/UL (ref 149–390)
PMV BLD AUTO: 11.5 FL (ref 8.9–12.7)
RBC # BLD AUTO: 3.52 MILLION/UL (ref 3.81–5.12)
RPR SER QL: NORMAL
WBC # BLD AUTO: 11.63 THOUSAND/UL (ref 4.31–10.16)

## 2021-10-26 PROCEDURE — 85025 COMPLETE CBC W/AUTO DIFF WBC: CPT

## 2021-10-26 PROCEDURE — 82950 GLUCOSE TEST: CPT

## 2021-10-26 PROCEDURE — 36415 COLL VENOUS BLD VENIPUNCTURE: CPT

## 2021-10-26 PROCEDURE — 86592 SYPHILIS TEST NON-TREP QUAL: CPT

## 2021-11-03 PROBLEM — Z34.03 FIRST PREGNANCY, THIRD TRIMESTER: Status: ACTIVE | Noted: 2021-07-15

## 2021-11-04 ENCOUNTER — ROUTINE PRENATAL (OUTPATIENT)
Dept: OBGYN CLINIC | Age: 31
End: 2021-11-04

## 2021-11-04 VITALS — WEIGHT: 224 LBS | DIASTOLIC BLOOD PRESSURE: 74 MMHG | SYSTOLIC BLOOD PRESSURE: 116 MMHG | BODY MASS INDEX: 37.28 KG/M2

## 2021-11-04 DIAGNOSIS — Z34.03 FIRST PREGNANCY, THIRD TRIMESTER: ICD-10-CM

## 2021-11-04 DIAGNOSIS — Z34.03 PRENATAL CARE, FIRST PREGNANCY, THIRD TRIMESTER: Primary | ICD-10-CM

## 2021-11-04 DIAGNOSIS — J45.909 ASTHMA, UNSPECIFIED ASTHMA SEVERITY, UNSPECIFIED WHETHER COMPLICATED, UNSPECIFIED WHETHER PERSISTENT: ICD-10-CM

## 2021-11-04 DIAGNOSIS — B00.9 HERPES: ICD-10-CM

## 2021-11-04 DIAGNOSIS — Z71.85 VACCINE COUNSELING: ICD-10-CM

## 2021-11-04 DIAGNOSIS — E66.09 CLASS 1 OBESITY DUE TO EXCESS CALORIES WITH BODY MASS INDEX (BMI) OF 32.0 TO 32.9 IN ADULT, UNSPECIFIED WHETHER SERIOUS COMORBIDITY PRESENT: ICD-10-CM

## 2021-11-04 LAB
SL AMB  POCT GLUCOSE, UA: NORMAL
SL AMB POCT URINE PROTEIN: NORMAL

## 2021-11-04 PROCEDURE — PNV: Performed by: STUDENT IN AN ORGANIZED HEALTH CARE EDUCATION/TRAINING PROGRAM

## 2021-11-17 ENCOUNTER — ROUTINE PRENATAL (OUTPATIENT)
Dept: OBGYN CLINIC | Facility: CLINIC | Age: 31
End: 2021-11-17

## 2021-11-17 VITALS
SYSTOLIC BLOOD PRESSURE: 124 MMHG | DIASTOLIC BLOOD PRESSURE: 70 MMHG | WEIGHT: 227 LBS | BODY MASS INDEX: 37.82 KG/M2 | HEIGHT: 65 IN

## 2021-11-17 DIAGNOSIS — Z34.03 ENCOUNTER FOR SUPERVISION OF NORMAL FIRST PREGNANCY IN THIRD TRIMESTER: Primary | ICD-10-CM

## 2021-11-17 LAB
SL AMB  POCT GLUCOSE, UA: NEGATIVE
SL AMB POCT URINE PROTEIN: NEGATIVE

## 2021-11-17 PROCEDURE — PNV: Performed by: OBSTETRICS & GYNECOLOGY

## 2021-11-30 ENCOUNTER — ROUTINE PRENATAL (OUTPATIENT)
Dept: OBGYN CLINIC | Age: 31
End: 2021-11-30
Payer: COMMERCIAL

## 2021-11-30 VITALS — SYSTOLIC BLOOD PRESSURE: 114 MMHG | DIASTOLIC BLOOD PRESSURE: 72 MMHG | BODY MASS INDEX: 38.01 KG/M2 | WEIGHT: 228.4 LBS

## 2021-11-30 DIAGNOSIS — Z34.03 FIRST PREGNANCY, THIRD TRIMESTER: ICD-10-CM

## 2021-11-30 DIAGNOSIS — Z34.83 ENCOUNTER FOR SUPERVISION OF OTHER NORMAL PREGNANCY IN THIRD TRIMESTER: Primary | ICD-10-CM

## 2021-11-30 DIAGNOSIS — Z23 NEED FOR TDAP VACCINATION: ICD-10-CM

## 2021-11-30 LAB
SL AMB  POCT GLUCOSE, UA: NEGATIVE
SL AMB POCT URINE PROTEIN: NEGATIVE

## 2021-11-30 PROCEDURE — 90471 IMMUNIZATION ADMIN: CPT

## 2021-11-30 PROCEDURE — PNV: Performed by: STUDENT IN AN ORGANIZED HEALTH CARE EDUCATION/TRAINING PROGRAM

## 2021-11-30 PROCEDURE — 90715 TDAP VACCINE 7 YRS/> IM: CPT

## 2021-12-03 ENCOUNTER — ULTRASOUND (OUTPATIENT)
Dept: PERINATAL CARE | Facility: OTHER | Age: 31
End: 2021-12-03
Payer: COMMERCIAL

## 2021-12-03 VITALS
HEART RATE: 78 BPM | BODY MASS INDEX: 37.99 KG/M2 | HEIGHT: 65 IN | DIASTOLIC BLOOD PRESSURE: 73 MMHG | WEIGHT: 228 LBS | SYSTOLIC BLOOD PRESSURE: 116 MMHG

## 2021-12-03 DIAGNOSIS — O99.210 OBESITY AFFECTING PREGNANCY, ANTEPARTUM: Primary | ICD-10-CM

## 2021-12-03 DIAGNOSIS — Z3A.32 32 WEEKS GESTATION OF PREGNANCY: ICD-10-CM

## 2021-12-03 PROCEDURE — 76816 OB US FOLLOW-UP PER FETUS: CPT | Performed by: OBSTETRICS & GYNECOLOGY

## 2021-12-03 PROCEDURE — 99212 OFFICE O/P EST SF 10 MIN: CPT | Performed by: OBSTETRICS & GYNECOLOGY

## 2021-12-14 ENCOUNTER — ROUTINE PRENATAL (OUTPATIENT)
Dept: OBGYN CLINIC | Age: 31
End: 2021-12-14

## 2021-12-14 VITALS — DIASTOLIC BLOOD PRESSURE: 84 MMHG | BODY MASS INDEX: 38.77 KG/M2 | SYSTOLIC BLOOD PRESSURE: 108 MMHG | WEIGHT: 233 LBS

## 2021-12-14 DIAGNOSIS — Z34.03 FIRST PREGNANCY, THIRD TRIMESTER: ICD-10-CM

## 2021-12-14 DIAGNOSIS — O99.210 OBESITY AFFECTING PREGNANCY, ANTEPARTUM: ICD-10-CM

## 2021-12-14 DIAGNOSIS — Z71.85 VACCINE COUNSELING: ICD-10-CM

## 2021-12-14 DIAGNOSIS — Z34.83 ENCOUNTER FOR SUPERVISION OF OTHER NORMAL PREGNANCY IN THIRD TRIMESTER: Primary | ICD-10-CM

## 2021-12-14 DIAGNOSIS — B00.9 HERPES: ICD-10-CM

## 2021-12-14 PROBLEM — E66.811 CLASS 1 OBESITY DUE TO EXCESS CALORIES WITH BODY MASS INDEX (BMI) OF 32.0 TO 32.9 IN ADULT: Status: RESOLVED | Noted: 2021-06-30 | Resolved: 2021-12-14

## 2021-12-14 PROBLEM — E66.09 CLASS 1 OBESITY DUE TO EXCESS CALORIES WITH BODY MASS INDEX (BMI) OF 32.0 TO 32.9 IN ADULT: Status: RESOLVED | Noted: 2021-06-30 | Resolved: 2021-12-14

## 2021-12-14 LAB
SL AMB  POCT GLUCOSE, UA: NORMAL
SL AMB POCT URINE PROTEIN: NORMAL

## 2021-12-14 PROCEDURE — PNV: Performed by: NURSE PRACTITIONER

## 2021-12-20 ENCOUNTER — TELEPHONE (OUTPATIENT)
Dept: OBGYN CLINIC | Facility: CLINIC | Age: 31
End: 2021-12-20

## 2021-12-27 ENCOUNTER — TELEPHONE (OUTPATIENT)
Dept: OBGYN CLINIC | Facility: CLINIC | Age: 31
End: 2021-12-27

## 2021-12-28 ENCOUNTER — ROUTINE PRENATAL (OUTPATIENT)
Dept: OBGYN CLINIC | Facility: CLINIC | Age: 31
End: 2021-12-28

## 2021-12-28 VITALS
SYSTOLIC BLOOD PRESSURE: 124 MMHG | WEIGHT: 238.6 LBS | DIASTOLIC BLOOD PRESSURE: 80 MMHG | BODY MASS INDEX: 39.75 KG/M2 | HEIGHT: 65 IN

## 2021-12-28 DIAGNOSIS — Z34.83 ENCOUNTER FOR SUPERVISION OF OTHER NORMAL PREGNANCY IN THIRD TRIMESTER: Primary | ICD-10-CM

## 2021-12-28 DIAGNOSIS — Z3A.36 36 WEEKS GESTATION OF PREGNANCY: ICD-10-CM

## 2021-12-28 DIAGNOSIS — B00.9 HERPES: ICD-10-CM

## 2021-12-28 PROBLEM — Z34.03 FIRST PREGNANCY, THIRD TRIMESTER: Status: RESOLVED | Noted: 2021-07-15 | Resolved: 2021-12-28

## 2021-12-28 LAB
SL AMB  POCT GLUCOSE, UA: NEGATIVE
SL AMB POCT URINE PROTEIN: NEGATIVE

## 2021-12-28 PROCEDURE — 87150 DNA/RNA AMPLIFIED PROBE: CPT | Performed by: OBSTETRICS & GYNECOLOGY

## 2021-12-28 PROCEDURE — PNV: Performed by: OBSTETRICS & GYNECOLOGY

## 2021-12-28 RX ORDER — VALACYCLOVIR HYDROCHLORIDE 500 MG/1
500 TABLET, FILM COATED ORAL DAILY
Qty: 30 TABLET | Refills: 3 | Status: SHIPPED | OUTPATIENT
Start: 2021-12-28 | End: 2022-01-19 | Stop reason: HOSPADM

## 2021-12-30 LAB — GP B STREP DNA SPEC QL NAA+PROBE: NEGATIVE

## 2021-12-31 ENCOUNTER — HOSPITAL ENCOUNTER (OUTPATIENT)
Facility: HOSPITAL | Age: 31
Discharge: HOME | End: 2021-12-31
Attending: OBSTETRICS & GYNECOLOGY | Admitting: OBSTETRICS & GYNECOLOGY
Payer: COMMERCIAL

## 2021-12-31 VITALS
SYSTOLIC BLOOD PRESSURE: 132 MMHG | HEART RATE: 90 BPM | TEMPERATURE: 98.3 F | DIASTOLIC BLOOD PRESSURE: 83 MMHG | OXYGEN SATURATION: 98 % | RESPIRATION RATE: 18 BRPM

## 2021-12-31 PROCEDURE — 99213 OFFICE O/P EST LOW 20 MIN: CPT

## 2021-12-31 PROCEDURE — NC001 PR NO CHARGE: Performed by: OBSTETRICS & GYNECOLOGY

## 2022-01-01 NOTE — PROGRESS NOTES
L&D Triage Note - OB/GYN  Winfield Koyanagi 36 y o  female MRN: [id number]  Unit/Bed#: LD TRIAGE  Encounter: [id number]        Patient is seen by Glenn Negron    ASSESSMENT/PLAN  Winfield Koyanagi is a 36 y o   at 36w6d here with contractions  1) r/o PTL  - cervix unchanged from office 3 days ago  - NST reactive, irregular contractions noted on toco  - patient not visibly uncomfortable, given return precautions      2)  Discharge instructions  - Patient instructed to call if experiencing worsening contractions, vaginal bleeding, loss of fluid or decreased fetal movement  - Will follow up with OBGYN in office on / Dr Gisella Mccormick  ______________    SUBJECTIVE    LE: Estimated Date of Delivery: 22    HPI:  36 y o   36w6d presents with complaint of contractions throughout the last day becoming more regular around 4:00 p m  this afternoon  States they are about the same distance apart in time and the same intensity as they have been throughout the day  Pregnancy is complicated by herpes controlled with Valtrex  This baby is also measuring in the 95th percentile per the last M ultrasound  Contractions: yes  Leakage of fluid: no  Vaginal Bleeding: no  Fetal movement: present    Her obstetrical history is significant for PCOS, herpes, asthma, BMI 39    ROS:  Constitutional: Negative  Respiratory: Negative  Cardiovascular: Negative    Gastrointestinal: Negative    Physical Exam  GEN: Well appearing, no apparent distress   ABD: Gravid, soft  SVE: Cervical Dilation: 1-2  Cervical Effacement: 70  Cervical Consistency: Soft  Fetal Station: -1  Presentation: Vertex  Method: Manual  OB Examiner: David    OBJECTIVE:  /83 (BP Location: Right arm)   Pulse 90   Temp 98 3 °F (36 8 °C) (Oral)   Resp 18   SpO2 98%   There is no height or weight on file to calculate BMI  Labs: No results found for this or any previous visit (from the past 24 hour(s))        SVE:  Cervical Dilation: 1-2  Cervical Effacement: 70  Cervical Consistency: Soft  Fetal Station: -1  Presentation: Vertex  Method: Manual  OB Examiner: Jaylan Pump    FHT:  Baseline Rate: 135 bpm  Variability: Moderate 6-25 bpm  Accelerations: 15 x 15 or greater  Decelerations: None    TOCO:   Contraction Frequency (minutes): irreuglar  Contraction Duration (seconds): 50-70  Contraction Quality: Mild        Radha Rey MD  OB/GYN PGY-1  12/31/2021  7:43 PM      Portions of the record may have been created with voice recognition software  Occasional wrong word or "sound a like" substitutions may have occurred due to the inherent limitations of voice recognition software    Read the chart carefully and recognize, using context, where substitutions have occurred

## 2022-01-06 ENCOUNTER — ROUTINE PRENATAL (OUTPATIENT)
Dept: OBGYN CLINIC | Age: 32
End: 2022-01-06

## 2022-01-06 ENCOUNTER — TELEPHONE (OUTPATIENT)
Dept: OBGYN CLINIC | Facility: CLINIC | Age: 32
End: 2022-01-06

## 2022-01-06 VITALS — WEIGHT: 241.4 LBS | SYSTOLIC BLOOD PRESSURE: 134 MMHG | DIASTOLIC BLOOD PRESSURE: 80 MMHG | BODY MASS INDEX: 40.17 KG/M2

## 2022-01-06 DIAGNOSIS — J45.909 ASTHMA, UNSPECIFIED ASTHMA SEVERITY, UNSPECIFIED WHETHER COMPLICATED, UNSPECIFIED WHETHER PERSISTENT: ICD-10-CM

## 2022-01-06 DIAGNOSIS — O99.210 OBESITY AFFECTING PREGNANCY, ANTEPARTUM: ICD-10-CM

## 2022-01-06 DIAGNOSIS — Z34.03 FIRST PREGNANCY, THIRD TRIMESTER: ICD-10-CM

## 2022-01-06 DIAGNOSIS — Z34.93 PREGNANCY, OBSTETRICAL CARE, THIRD TRIMESTER: Primary | ICD-10-CM

## 2022-01-06 DIAGNOSIS — B00.9 HERPES: ICD-10-CM

## 2022-01-06 PROBLEM — Z3A.37 37 WEEKS GESTATION OF PREGNANCY: Status: ACTIVE | Noted: 2021-12-28

## 2022-01-06 LAB
SL AMB  POCT GLUCOSE, UA: NORMAL
SL AMB POCT URINE PROTEIN: NORMAL

## 2022-01-06 PROCEDURE — PNV: Performed by: STUDENT IN AN ORGANIZED HEALTH CARE EDUCATION/TRAINING PROGRAM

## 2022-01-06 NOTE — PROGRESS NOTES
Patient presents for a routine prenatal visit    37W 5D  Good Fetal Movement  No LOF,bleeding  Has some jelly discharge and some cramping  Would like cervix checked  Urine-neg    Declines flu and covid vaccines  UTD Tdap vaccine    GBS Neg    L&D and breast pump form signed and scanned under media

## 2022-01-06 NOTE — ASSESSMENT & PLAN NOTE
Basil Olmstead is a 36 y o   at 37w5d presents today for routine PNV  Denies leakage of fluid, vaginal bleeding, contractions    Good fetal movement   -previously declined flu and COVID vaccines, not discussed today  -s/p tdap  -GBS negative  -Cervical exam today  / -2    Reviewed precautions, routine 1 week follow up

## 2022-01-06 NOTE — ASSESSMENT & PLAN NOTE
For daily valtrex for suppression  Reviewed if acute outbreak or prodromal symptoms at this time of labor would recommend against vaginal delivery

## 2022-01-06 NOTE — TELEPHONE ENCOUNTER
----- Message from Graciela Boyer MD sent at 1/6/2022  9:31 AM EST -----  Regarding: iol  Procedure to be scheduled (IOL or CS): iol  LE: Estimated Date of Delivery: 1/22/22  Indication for delivery: elective  Requested date (s) of delivery: 1/17/2022    If requested date is unavailable, is there a date by which the pt must be delivered?  1/29/2022  Physician preference: none    If IOL, anticipated method: claire/cytotec

## 2022-01-06 NOTE — PROGRESS NOTES
Problem List Items Addressed This Visit        Respiratory    Asthma     PRN albuterol            Other    Herpes     For daily valtrex for suppression  Reviewed if acute outbreak or prodromal symptoms at this time of labor would recommend against vaginal delivery         First pregnancy, third trimester     Garrel Patient is a 36 y o   at 37w5d presents today for routine PNV  Denies leakage of fluid, vaginal bleeding, contractions  Good fetal movement   -previously declined flu and COVID vaccines, not discussed today  -s/p tdap  -GBS negative  -Cervical exam today     Reviewed precautions, routine 1 week follow up         Obesity affecting pregnancy, antepartum     Pregravid BMI 32, goal for pregnancy <20  TWG to date 21 5 kg (47 lb 6 4 oz)   Reviewed walks, goal 30 min 5x week and avoiding sugary drinks, snacks, refined carbohydrates           Other Visit Diagnoses     Pregnancy, obstetrical care, third trimester    -  Primary    Relevant Orders    POCT urine dip (Completed)

## 2022-01-06 NOTE — ASSESSMENT & PLAN NOTE
Pregravid BMI 32, goal for pregnancy <20  TWG to date 21 5 kg (47 lb 6 4 oz)   Reviewed walks, goal 30 min 5x week and avoiding sugary drinks, snacks, refined carbohydrates

## 2022-01-10 NOTE — TELEPHONE ENCOUNTER
Spoke with [facility] no opening for elective on 01/16, next avail 01/17 unless medically indicated taken  To be called 01/11 due to 7 day protocol

## 2022-01-10 NOTE — TELEPHONE ENCOUNTER
Spoke with [facility], opening for 01/16, pt scheduled for [facility] 01/16/22 8 pm       Pt contacted # [phone]

## 2022-01-11 ENCOUNTER — ROUTINE PRENATAL (OUTPATIENT)
Dept: OBGYN CLINIC | Age: 32
End: 2022-01-11

## 2022-01-11 VITALS
BODY MASS INDEX: 40.12 KG/M2 | DIASTOLIC BLOOD PRESSURE: 78 MMHG | WEIGHT: 240.8 LBS | SYSTOLIC BLOOD PRESSURE: 122 MMHG | HEIGHT: 65 IN

## 2022-01-11 DIAGNOSIS — Z34.03 FIRST PREGNANCY, THIRD TRIMESTER: ICD-10-CM

## 2022-01-11 DIAGNOSIS — Z34.83 ENCOUNTER FOR SUPERVISION OF OTHER NORMAL PREGNANCY IN THIRD TRIMESTER: Primary | ICD-10-CM

## 2022-01-11 LAB
SL AMB  POCT GLUCOSE, UA: NEGATIVE
SL AMB POCT URINE PROTEIN: NEGATIVE

## 2022-01-11 PROCEDURE — PNV: Performed by: STUDENT IN AN ORGANIZED HEALTH CARE EDUCATION/TRAINING PROGRAM

## 2022-01-11 NOTE — PROGRESS NOTES
Pt is here for routine ob visit  No concerns at this time   Urine neg/neg   No LOF,VB  + Contractions/not consistent   +FM   Delivery consent signed   UTD tdap  Declined flu/covid vaccines

## 2022-01-11 NOTE — PROGRESS NOTES
36 y o   at 38w3d, here for return OB visit  Feeling well overall and without concerns  Good FM  Denies LOF, VB, regular/painful contractions  Problem List Items Addressed This Visit        Other    First pregnancy, third trimester     -precautions reviewed  -s/p Tdap vaccine  -GBS neg  -IOL scheduled for this  PM  -Cervical exam    Discussed that we will strip membranes, likely just use cytotec for IOL this weekend  -prepregnancy BMI 32 with goal weight gain 11-30#: TWG = 46#, walking             Other Visit Diagnoses     Encounter for supervision of other normal pregnancy in third trimester    -  Primary    Relevant Orders    POCT urine dip (Completed)

## 2022-01-11 NOTE — ASSESSMENT & PLAN NOTE
-precautions reviewed  -s/p Tdap vaccine  -GBS neg  -IOL scheduled for this Sunday PM  -Cervical exam 2/80/-2   Discussed that we will strip membranes, likely just use cytotec for IOL this weekend  -prepregnancy BMI 32 with goal weight gain 11-30#: TWG = 46#, walking

## 2022-01-16 ENCOUNTER — HOSPITAL ENCOUNTER (INPATIENT)
Facility: HOSPITAL | Age: 32
LOS: 3 days | Discharge: HOME | End: 2022-01-19
Attending: STUDENT IN AN ORGANIZED HEALTH CARE EDUCATION/TRAINING PROGRAM | Admitting: STUDENT IN AN ORGANIZED HEALTH CARE EDUCATION/TRAINING PROGRAM
Payer: COMMERCIAL

## 2022-01-16 ENCOUNTER — HOSPITAL ENCOUNTER (OUTPATIENT)
Dept: LABOR AND DELIVERY | Facility: HOSPITAL | Age: 32
Discharge: HOME | End: 2022-01-16
Payer: COMMERCIAL

## 2022-01-16 DIAGNOSIS — Z3A.39 39 WEEKS GESTATION OF PREGNANCY: Primary | ICD-10-CM

## 2022-01-16 LAB
ABO GROUP BLD: NORMAL
BLD GP AB SCN SERPL QL: NEGATIVE
ERYTHROCYTE [DISTWIDTH] IN BLOOD BY AUTOMATED COUNT: 14.3 % (ref 11.6–15.1)
HCT VFR BLD AUTO: 32.5 % (ref 34.8–46.1)
HGB BLD-MCNC: 10.7 G/DL (ref 11.5–15.4)
HIV 1+2 AB+HIV1 P24 AG SERPL QL IA: NORMAL
HIV1 P24 AG SER QL: NORMAL
MCH RBC QN AUTO: 30.4 PG (ref 26.8–34.3)
MCHC RBC AUTO-ENTMCNC: 32.9 G/DL (ref 31.4–37.4)
MCV RBC AUTO: 92 FL (ref 82–98)
PLATELET # BLD AUTO: 266 THOUSANDS/UL (ref 149–390)
PMV BLD AUTO: 11.9 FL (ref 8.9–12.7)
RBC # BLD AUTO: 3.52 MILLION/UL (ref 3.81–5.12)
RH BLD: POSITIVE
SPECIMEN EXPIRATION DATE: NORMAL
WBC # BLD AUTO: 11.71 THOUSAND/UL (ref 4.31–10.16)

## 2022-01-16 PROCEDURE — 86901 BLOOD TYPING SEROLOGIC RH(D): CPT

## 2022-01-16 PROCEDURE — 4A1HXCZ MONITORING OF PRODUCTS OF CONCEPTION, CARDIAC RATE, EXTERNAL APPROACH: ICD-10-PCS | Performed by: STUDENT IN AN ORGANIZED HEALTH CARE EDUCATION/TRAINING PROGRAM

## 2022-01-16 PROCEDURE — 3E033VJ INTRODUCTION OF OTHER HORMONE INTO PERIPHERAL VEIN, PERCUTANEOUS APPROACH: ICD-10-PCS | Performed by: STUDENT IN AN ORGANIZED HEALTH CARE EDUCATION/TRAINING PROGRAM

## 2022-01-16 PROCEDURE — 3E0P7VZ INTRODUCTION OF HORMONE INTO FEMALE REPRODUCTIVE, VIA NATURAL OR ARTIFICIAL OPENING: ICD-10-PCS | Performed by: STUDENT IN AN ORGANIZED HEALTH CARE EDUCATION/TRAINING PROGRAM

## 2022-01-16 PROCEDURE — NC001 PR NO CHARGE: Performed by: STUDENT IN AN ORGANIZED HEALTH CARE EDUCATION/TRAINING PROGRAM

## 2022-01-16 PROCEDURE — 85027 COMPLETE CBC AUTOMATED: CPT

## 2022-01-16 PROCEDURE — 86592 SYPHILIS TEST NON-TREP QUAL: CPT

## 2022-01-16 PROCEDURE — 86850 RBC ANTIBODY SCREEN: CPT

## 2022-01-16 PROCEDURE — 86900 BLOOD TYPING SEROLOGIC ABO: CPT

## 2022-01-16 PROCEDURE — 10907ZC DRAINAGE OF AMNIOTIC FLUID, THERAPEUTIC FROM PRODUCTS OF CONCEPTION, VIA NATURAL OR ARTIFICIAL OPENING: ICD-10-PCS | Performed by: STUDENT IN AN ORGANIZED HEALTH CARE EDUCATION/TRAINING PROGRAM

## 2022-01-16 PROCEDURE — 87806 HIV AG W/HIV1&2 ANTB W/OPTIC: CPT

## 2022-01-16 PROCEDURE — 87340 HEPATITIS B SURFACE AG IA: CPT

## 2022-01-16 RX ORDER — ONDANSETRON 2 MG/ML
4 INJECTION INTRAMUSCULAR; INTRAVENOUS EVERY 6 HOURS PRN
Status: DISCONTINUED | OUTPATIENT
Start: 2022-01-16 | End: 2022-01-17

## 2022-01-16 RX ORDER — VALACYCLOVIR HYDROCHLORIDE 500 MG/1
500 TABLET, FILM COATED ORAL DAILY
Status: DISCONTINUED | OUTPATIENT
Start: 2022-01-17 | End: 2022-01-19 | Stop reason: HOSPADM

## 2022-01-16 RX ORDER — SODIUM CHLORIDE, SODIUM LACTATE, POTASSIUM CHLORIDE, CALCIUM CHLORIDE 600; 310; 30; 20 MG/100ML; MG/100ML; MG/100ML; MG/100ML
125 INJECTION, SOLUTION INTRAVENOUS CONTINUOUS
Status: DISCONTINUED | OUTPATIENT
Start: 2022-01-16 | End: 2022-01-19 | Stop reason: HOSPADM

## 2022-01-16 RX ADMIN — Medication 25 MCG: at 21:40

## 2022-01-16 RX ADMIN — SODIUM CHLORIDE, SODIUM LACTATE, POTASSIUM CHLORIDE, AND CALCIUM CHLORIDE 125 ML/HR: .6; .31; .03; .02 INJECTION, SOLUTION INTRAVENOUS at 21:00

## 2022-01-17 ENCOUNTER — ANESTHESIA (INPATIENT)
Dept: ANESTHESIOLOGY | Facility: HOSPITAL | Age: 32
End: 2022-01-17
Payer: COMMERCIAL

## 2022-01-17 ENCOUNTER — ANESTHESIA EVENT (INPATIENT)
Dept: ANESTHESIOLOGY | Facility: HOSPITAL | Age: 32
End: 2022-01-17
Payer: COMMERCIAL

## 2022-01-17 LAB
ALBUMIN SERPL BCP-MCNC: 2.6 G/DL (ref 3.5–5)
ALP SERPL-CCNC: 195 U/L (ref 46–116)
ALT SERPL W P-5'-P-CCNC: 29 U/L (ref 12–78)
ANION GAP SERPL CALCULATED.3IONS-SCNC: 13 MMOL/L (ref 4–13)
AST SERPL W P-5'-P-CCNC: 24 U/L (ref 5–45)
BASE EXCESS BLDCOA CALC-SCNC: -9.5 MMOL/L (ref 3–11)
BASE EXCESS BLDCOV CALC-SCNC: -6.2 MMOL/L (ref 1–9)
BILIRUB SERPL-MCNC: 0.17 MG/DL (ref 0.2–1)
BUN SERPL-MCNC: 11 MG/DL (ref 5–25)
CALCIUM ALBUM COR SERPL-MCNC: 10.2 MG/DL (ref 8.3–10.1)
CALCIUM SERPL-MCNC: 9.1 MG/DL (ref 8.3–10.1)
CHLORIDE SERPL-SCNC: 106 MMOL/L (ref 100–108)
CO2 SERPL-SCNC: 19 MMOL/L (ref 21–32)
CREAT SERPL-MCNC: 0.77 MG/DL (ref 0.6–1.3)
CREAT UR-MCNC: 37 MG/DL
GFR SERPL CREATININE-BSD FRML MDRD: 104 ML/MIN/1.73SQ M
GLUCOSE SERPL-MCNC: 84 MG/DL (ref 65–140)
HBV SURFACE AG SER QL: NORMAL
HCO3 BLDCOA-SCNC: 19.1 MMOL/L (ref 17.3–27.3)
HCO3 BLDCOV-SCNC: 18.8 MMOL/L (ref 12.2–28.6)
O2 CT VFR BLDCOA CALC: 4.5 ML/DL
OXYHGB MFR BLDCOA: 21.6 %
OXYHGB MFR BLDCOV: 79.8 %
PCO2 BLDCOA: 52.4 MM[HG] (ref 30–60)
PCO2 BLDCOV: 35.7 MM HG (ref 27–43)
PH BLDCOA: 7.18 [PH] (ref 7.23–7.43)
PH BLDCOV: 7.34 [PH] (ref 7.19–7.49)
PO2 BLDCOA: 13 MM HG (ref 5–25)
PO2 BLDCOV: 37.2 MM HG (ref 15–45)
POTASSIUM SERPL-SCNC: 3.9 MMOL/L (ref 3.5–5.3)
PROT SERPL-MCNC: 6.3 G/DL (ref 6.4–8.2)
PROT UR-MCNC: 9 MG/DL
PROT/CREAT UR: 0.24 MG/G{CREAT} (ref 0–0.1)
RPR SER QL: NORMAL
SAO2 % BLDCOV: 16.6 ML/DL
SODIUM SERPL-SCNC: 138 MMOL/L (ref 136–145)

## 2022-01-17 PROCEDURE — 80053 COMPREHEN METABOLIC PANEL: CPT

## 2022-01-17 PROCEDURE — NC001 PR NO CHARGE: Performed by: STUDENT IN AN ORGANIZED HEALTH CARE EDUCATION/TRAINING PROGRAM

## 2022-01-17 PROCEDURE — 82805 BLOOD GASES W/O2 SATURATION: CPT | Performed by: STUDENT IN AN ORGANIZED HEALTH CARE EDUCATION/TRAINING PROGRAM

## 2022-01-17 PROCEDURE — 10J17ZZ INSPECTION OF PRODUCTS OF CONCEPTION, RETAINED, VIA NATURAL OR ARTIFICIAL OPENING: ICD-10-PCS | Performed by: STUDENT IN AN ORGANIZED HEALTH CARE EDUCATION/TRAINING PROGRAM

## 2022-01-17 PROCEDURE — 0KQM0ZZ REPAIR PERINEUM MUSCLE, OPEN APPROACH: ICD-10-PCS | Performed by: STUDENT IN AN ORGANIZED HEALTH CARE EDUCATION/TRAINING PROGRAM

## 2022-01-17 PROCEDURE — 82570 ASSAY OF URINE CREATININE: CPT

## 2022-01-17 PROCEDURE — 59400 OBSTETRICAL CARE: CPT | Performed by: STUDENT IN AN ORGANIZED HEALTH CARE EDUCATION/TRAINING PROGRAM

## 2022-01-17 PROCEDURE — 84156 ASSAY OF PROTEIN URINE: CPT

## 2022-01-17 RX ORDER — DOCUSATE SODIUM 100 MG/1
100 CAPSULE, LIQUID FILLED ORAL 2 TIMES DAILY
Status: DISCONTINUED | OUTPATIENT
Start: 2022-01-17 | End: 2022-01-17 | Stop reason: SDUPTHER

## 2022-01-17 RX ORDER — IBUPROFEN 600 MG/1
600 TABLET ORAL EVERY 6 HOURS
Status: DISCONTINUED | OUTPATIENT
Start: 2022-01-17 | End: 2022-01-19 | Stop reason: HOSPADM

## 2022-01-17 RX ORDER — CALCIUM CARBONATE 200(500)MG
1000 TABLET,CHEWABLE ORAL DAILY PRN
Status: DISCONTINUED | OUTPATIENT
Start: 2022-01-17 | End: 2022-01-19 | Stop reason: HOSPADM

## 2022-01-17 RX ORDER — ACETAMINOPHEN 325 MG/1
650 TABLET ORAL EVERY 4 HOURS PRN
Status: DISCONTINUED | OUTPATIENT
Start: 2022-01-17 | End: 2022-01-19 | Stop reason: HOSPADM

## 2022-01-17 RX ORDER — DOCUSATE SODIUM 100 MG/1
100 CAPSULE, LIQUID FILLED ORAL 2 TIMES DAILY
Status: DISCONTINUED | OUTPATIENT
Start: 2022-01-17 | End: 2022-01-19 | Stop reason: HOSPADM

## 2022-01-17 RX ORDER — SIMETHICONE 80 MG
80 TABLET,CHEWABLE ORAL 4 TIMES DAILY PRN
Status: DISCONTINUED | OUTPATIENT
Start: 2022-01-17 | End: 2022-01-19 | Stop reason: HOSPADM

## 2022-01-17 RX ORDER — OXYTOCIN/RINGER'S LACTATE 30/500 ML
1-30 PLASTIC BAG, INJECTION (ML) INTRAVENOUS
Status: DISCONTINUED | OUTPATIENT
Start: 2022-01-17 | End: 2022-01-17

## 2022-01-17 RX ORDER — DIPHENHYDRAMINE HCL 25 MG
25 TABLET ORAL EVERY 6 HOURS PRN
Status: DISCONTINUED | OUTPATIENT
Start: 2022-01-17 | End: 2022-01-19 | Stop reason: HOSPADM

## 2022-01-17 RX ORDER — DIAPER,BRIEF,INFANT-TODD,DISP
1 EACH MISCELLANEOUS DAILY PRN
Status: DISCONTINUED | OUTPATIENT
Start: 2022-01-17 | End: 2022-01-19 | Stop reason: HOSPADM

## 2022-01-17 RX ORDER — ACETAMINOPHEN 325 MG/1
650 TABLET ORAL EVERY 4 HOURS PRN
Status: DISCONTINUED | OUTPATIENT
Start: 2022-01-17 | End: 2022-01-17 | Stop reason: SDUPTHER

## 2022-01-17 RX ORDER — OXYTOCIN/RINGER'S LACTATE 30/500 ML
62.5 PLASTIC BAG, INJECTION (ML) INTRAVENOUS CONTINUOUS
Status: DISPENSED | OUTPATIENT
Start: 2022-01-17 | End: 2022-01-18

## 2022-01-17 RX ORDER — LIDOCAINE HYDROCHLORIDE AND EPINEPHRINE 15; 5 MG/ML; UG/ML
INJECTION, SOLUTION EPIDURAL
Status: COMPLETED | OUTPATIENT
Start: 2022-01-17 | End: 2022-01-17

## 2022-01-17 RX ORDER — ONDANSETRON 2 MG/ML
4 INJECTION INTRAMUSCULAR; INTRAVENOUS EVERY 8 HOURS PRN
Status: DISCONTINUED | OUTPATIENT
Start: 2022-01-17 | End: 2022-01-19 | Stop reason: HOSPADM

## 2022-01-17 RX ORDER — LIDOCAINE HYDROCHLORIDE 10 MG/ML
INJECTION, SOLUTION EPIDURAL; INFILTRATION; INTRACAUDAL; PERINEURAL
Status: COMPLETED | OUTPATIENT
Start: 2022-01-17 | End: 2022-01-17

## 2022-01-17 RX ORDER — CEFAZOLIN SODIUM 2 G/50ML
2000 SOLUTION INTRAVENOUS ONCE
Status: COMPLETED | OUTPATIENT
Start: 2022-01-17 | End: 2022-01-17

## 2022-01-17 RX ORDER — ROPIVACAINE HYDROCHLORIDE 2 MG/ML
INJECTION, SOLUTION EPIDURAL; INFILTRATION; PERINEURAL
Status: COMPLETED | OUTPATIENT
Start: 2022-01-17 | End: 2022-01-17

## 2022-01-17 RX ADMIN — VALACYCLOVIR HYDROCHLORIDE 500 MG: 500 TABLET, FILM COATED ORAL at 10:06

## 2022-01-17 RX ADMIN — ROPIVACAINE HYDROCHLORIDE: 2 INJECTION, SOLUTION EPIDURAL; INFILTRATION at 16:36

## 2022-01-17 RX ADMIN — BENZOCAINE AND LEVOMENTHOL 1 APPLICATION: 200; 5 SPRAY TOPICAL at 21:03

## 2022-01-17 RX ADMIN — ROPIVACAINE HYDROCHLORIDE 6 ML: 2 INJECTION, SOLUTION EPIDURAL; INFILTRATION at 03:47

## 2022-01-17 RX ADMIN — WITCH HAZEL 1 PAD: 500 SOLUTION RECTAL; TOPICAL at 21:03

## 2022-01-17 RX ADMIN — ROPIVACAINE HYDROCHLORIDE: 2 INJECTION, SOLUTION EPIDURAL; INFILTRATION at 03:58

## 2022-01-17 RX ADMIN — ONDANSETRON 4 MG: 2 INJECTION INTRAMUSCULAR; INTRAVENOUS at 16:39

## 2022-01-17 RX ADMIN — SODIUM CHLORIDE, SODIUM LACTATE, POTASSIUM CHLORIDE, AND CALCIUM CHLORIDE 999 ML/HR: .6; .31; .03; .02 INJECTION, SOLUTION INTRAVENOUS at 03:23

## 2022-01-17 RX ADMIN — HYDROCORTISONE 1 APPLICATION: 1 CREAM TOPICAL at 21:03

## 2022-01-17 RX ADMIN — SODIUM CHLORIDE, SODIUM LACTATE, POTASSIUM CHLORIDE, AND CALCIUM CHLORIDE 125 ML/HR: .6; .31; .03; .02 INJECTION, SOLUTION INTRAVENOUS at 14:53

## 2022-01-17 RX ADMIN — CEFAZOLIN SODIUM 2000 MG: 2 SOLUTION INTRAVENOUS at 21:02

## 2022-01-17 RX ADMIN — SODIUM CHLORIDE, SODIUM LACTATE, POTASSIUM CHLORIDE, AND CALCIUM CHLORIDE 125 ML/HR: .6; .31; .03; .02 INJECTION, SOLUTION INTRAVENOUS at 07:01

## 2022-01-17 RX ADMIN — Medication 2 MILLI-UNITS/MIN: at 02:06

## 2022-01-17 RX ADMIN — Medication 62.5 MILLI-UNITS/MIN: at 20:25

## 2022-01-17 RX ADMIN — LIDOCAINE HYDROCHLORIDE 3 ML: 10 INJECTION, SOLUTION EPIDURAL; INFILTRATION; INTRACAUDAL; PERINEURAL at 03:47

## 2022-01-17 RX ADMIN — ROPIVACAINE HYDROCHLORIDE: 2 INJECTION, SOLUTION EPIDURAL; INFILTRATION at 10:46

## 2022-01-17 RX ADMIN — IBUPROFEN 600 MG: 600 TABLET ORAL at 21:34

## 2022-01-17 RX ADMIN — LIDOCAINE HYDROCHLORIDE AND EPINEPHRINE 5 ML: 15; 5 INJECTION, SOLUTION EPIDURAL at 03:47

## 2022-01-17 NOTE — ANESTHESIA PREPROCEDURE EVALUATION
Procedure:  LABOR ANALGESIA    Relevant Problems   GYN   (+) 39 weeks gestation of pregnancy      PULMONARY   (+) Asthma        Physical Exam    Airway    Mallampati score: II  TM Distance: >3 FB  Neck ROM: full     Dental       Cardiovascular  Cardiovascular exam normal    Pulmonary  Pulmonary exam normal     Other Findings      Abnormal Pap smear of cervix    Herpes last outbreak 1 year ago   Genital warts Spouse has history   Asthma albuterol as needed       Anesthesia Plan  ASA Score- 2     Anesthesia Type- epidural with ASA Monitors  Additional Monitors:   Airway Plan:           Plan Factors-Exercise tolerance (METS): >4 METS  Chart reviewed  EKG reviewed  Imaging results reviewed  Existing labs reviewed  Patient summary reviewed  Induction- intravenous  Postoperative Plan-     Informed Consent- Anesthetic plan and risks discussed with patient  I personally reviewed this patient with the CRNA  Discussed and agreed on the Anesthesia Plan with the CRNA  Dago Novoa

## 2022-01-17 NOTE — H&P
[address] [address] 36 y o  female MRN: [id number]  Unit/Bed#: -01 Encounter: [id number]      Assessment: 36 y o   at 39w1d admitted for elective induction of labor  1) Pregnancy at 39w1d  - SVE: 2-3/60/-2  - FHT: 130s  - Clinical EFW: 12/3 95%tile    - Vertex confirmed by transabdominal ultrasound    2) Induction of Labor for Elective at term  - Admit   - CBC, RPR, Type & Screen   - Will plan to begin induction with Cytotec and claire balloon       3) GBS negative  - Antibiotics not indicated      4) Postpartum contraception  - plans for nuvaring    5) Pain management  - Analgesia at maternal Request     6) HSV   - on Valtrex suppression   - no recent outbreaks      Dr Areli Irwin aware        SUBJECTIVE:    Chief Complaint: I am here for my induction of labor     HPI: Sky Quan is a 36 y o   with an LE of 2022, by Ultrasound at 39w1d who is being admitted for elective induction of labor  She denies having uterine contractions, has no LOF, and reports no VB  She states she has felt good FM  [place] Pregnancy complications: HSV, Elevated BMI     Patient Active Problem List   Diagnosis    PCOS (polycystic ovarian syndrome)    Herpes    Asthma    First pregnancy, third trimester    Vaccine counseling    Obesity affecting pregnancy, antepartum    39 weeks gestation of pregnancy       Baby complications/comments: 53/33 Growth Scan EFW 95%tile, AC 95%tile, BPD >97%tile, HC 76%tile, Femur 80%tile     Review of Systems   Constitutional: Negative  HENT: Negative  Eyes: Negative  Respiratory: Negative  Cardiovascular: Negative  Gastrointestinal: Negative  Endocrine: Negative  Genitourinary: Negative  Musculoskeletal: Negative  Skin: Negative  Allergic/Immunologic: Negative  Neurological: Negative  Hematological: Negative  Psychiatric/Behavioral: Negative          OB History    Para Term  AB Living   1             SAB IAB Ectopic Multiple Live Births                  # Outcome Date GA Lbr Tyrone/2nd Weight Sex Delivery Anes PTL Lv   1 Current                Past Medical History:   Diagnosis Date    Abnormal Pap smear of cervix     Asthma     albuterol as needed    Genital warts     Spouse has history    Herpes     last outbreak 1 year ago       Past Surgical History:   Procedure Laterality Date    WISDOM TOOTH EXTRACTION         Social History     Tobacco Use    Smoking status: Former Smoker     Years: 1 00    Smokeless tobacco: Never Used    Tobacco comment: Socially, stopped 5 yeas ago   Substance Use Topics    Alcohol use: Not Currently     Comment: socially       Allergies   Allergen Reactions    Sulfa Antibiotics Hives       Medications Prior to Admission   Medication    APPLE CIDER VINEGAR PO    ASHWAGANDHA PO    cholecalciferol (VITAMIN D3) 1,000 units tablet    Fexofenadine HCl (ALLEGRA PO)    Prenatal MV & Min w/FA-DHA (ONE A DAY PRENATAL PO)    Probiotic Product (PROBIOTIC DAILY PO)    valACYclovir (VALTREX) 500 mg tablet    valACYclovir (VALTREX) 500 mg tablet           OBJECTIVE:  Vitals: There is no height or weight on file to calculate BMI  Physical Exam:  Physical Exam  Constitutional:       General: She is not in acute distress  Appearance: Normal appearance  HENT:      Head: Normocephalic and atraumatic  Cardiovascular:      Rate and Rhythm: Normal rate  Pulmonary:      Effort: Pulmonary effort is normal    Abdominal:      Palpations: Abdomen is soft  Comments: Gravid   Neurological:      General: No focal deficit present  Mental Status: She is alert  Skin:     General: Skin is warm and dry     Psychiatric:         Mood and Affect: Mood normal          Behavior: Behavior normal           SVE:       FHT:       TOCO:        Lab Results   Component Value Date    WBC 11 63 (H) 10/26/2021    HGB 11 5 10/26/2021    HCT 34 6 (L) 10/26/2021     10/26/2021     No results found for: NA, K, CL, CO2, BUN, CREATININE, GLUCOSE, AST, ALT    Prenatal Labs: Reviewed      Blood type: O+  Antibody: negative  Group B strep: negative  HIV: negative  Hepatitis B: negative  RPR: non-reactive  Rubella: Immune  Varicella: Immune  1 hour Glucose: 131  Platelets: 333  Hgb: 11 5    >2 Midnights  INPATIENT       Jadyn Suero MD  OB/GYN PGY-1  1/16/2022  8:07 PM

## 2022-01-17 NOTE — OB LABOR/OXYTOCIN SAFETY PROGRESS
Oxytocin Safety Progress Check Note - Stone Santiago 36 y o  female MRN: [id number]    Unit/Bed#: -01 Encounter: [id number]    Dose (keisha-units/min) Oxytocin: 2 keisha-units/min  Contraction Frequency (minutes): 1 5-3 5  Contraction Quality: Moderate  Tachysystole: No   Cervical Dilation: 6        Cervical Effacement: 80  Fetal Station: -1  Baseline Rate: 125 bpm  Fetal Heart Rate: 135 BPM  FHR Category: Category I               Vital Signs:   Vitals:    01/17/22 0412   BP: 131/73   Pulse: 83   Resp:    Temp:    SpO2:            Notes/comments:    Due for check  SVE at this time 6/80/-1  Cervix very anterior and soft  Patient now comfortable with epidural  Continue pitocin titration       Vielka Osborn MD 1/17/2022 4:18 AM

## 2022-01-17 NOTE — OB LABOR/OXYTOCIN SAFETY PROGRESS
Oxytocin Safety Progress Check Note - Basil Olmstead 36 y o  female MRN: [id number]    Unit/Bed#: -01 Encounter: [id number]    Dose (keisha-units/min) Oxytocin: 6 keisha-units/min  Contraction Frequency (minutes): 1 5-3 with couplets  Contraction Quality: Moderate  Tachysystole: No   Cervical Dilation: 10  Dilation Complete Date: 01/17/22     Cervical Effacement: 100  Fetal Station: 1  Baseline Rate: 130 bpm  Fetal Heart Rate: 130 BPM  FHR Category: Category I     Vital Signs:   Vitals:    01/17/22 1610   BP: 122/75   Pulse: 90   Resp: 18   Temp: 97 6 °F (36 4 °C)   SpO2:      Notes/comments:     Completely dilated and pushing with good effort  Fetal position appears IGNACIO  FHT is cat I with moderate variability  Discussed with Dr Jhon Arias [place], [place] 1/17/2022 4:45 PM

## 2022-01-17 NOTE — PLAN OF CARE
Problem: Knowledge Deficit  Goal: Verbalizes understanding of labor plan  Description: Assess patient/family/caregiver's baseline knowledge level and ability to understand information  Provide education via patient/family/caregiver's preferred learning method at appropriate level of understanding  1  Provide teaching at level of understanding  2  Provide teaching via preferred learning method(s)  Outcome: Progressing  Goal: Patient/family/caregiver demonstrates understanding of disease process, treatment plan, medications, and discharge instructions  Description: Complete learning assessment and assess knowledge base  Interventions:  - Provide teaching at level of understanding  - Provide teaching via preferred learning methods  Outcome: Progressing     Problem: Labor & Delivery  Goal: Manages discomfort  Description: Assess and monitor for signs and symptoms of discomfort  Assess patient's pain level regularly and per hospital policy  Administer medications as ordered  Support use of nonpharmacological methods to help control pain such as distraction, imagery, relaxation, and application of heat and cold  Collaborate with interdisciplinary team and patient to determine appropriate pain management plan  1  Include patient in decisions related to comfort  2  Offer non-pharmacological pain management interventions  3  Report ineffective pain management to physician  Outcome: Progressing  Goal: Patient vital signs are stable  Description: 1  Assess vital signs - vaginal delivery    Outcome: Progressing     Problem: PAIN - ADULT  Goal: Verbalizes/displays adequate comfort level or baseline comfort level  Description: Interventions:  - Encourage patient to monitor pain and request assistance  - Assess pain using appropriate pain scale  - Administer analgesics based on type and severity of pain and evaluate response  - Implement non-pharmacological measures as appropriate and evaluate response  - Consider cultural and social influences on pain and pain management  - Notify physician/advanced practitioner if interventions unsuccessful or patient reports new pain  Outcome: Progressing     Problem: INFECTION - ADULT  Goal: Absence or prevention of progression during hospitalization  Description: INTERVENTIONS:  - Assess and monitor for signs and symptoms of infection  - Monitor lab/diagnostic results  - Monitor all insertion sites, i e  indwelling lines, tubes, and drains  - Monitor endotracheal if appropriate and nasal secretions for changes in amount and color  - Kimberly appropriate cooling/warming therapies per order  - Administer medications as ordered  - Instruct and encourage patient and family to use good hand hygiene technique  - Identify and instruct in appropriate isolation precautions for identified infection/condition  Outcome: Progressing  Goal: Absence of fever/infection during neutropenic period  Description: INTERVENTIONS:  - Monitor WBC    Outcome: Progressing     Problem: SAFETY ADULT  Goal: Patient will remain free of falls  Description: INTERVENTIONS:  - Educate patient/family on patient safety including physical limitations  - Instruct patient to call for assistance with activity   - Consult OT/PT to assist with strengthening/mobility   - Keep Call bell within reach  - Keep bed low and locked with side rails adjusted as appropriate  - Keep care items and personal belongings within reach  - Initiate and maintain comfort rounds  - Make Fall Risk Sign visible to staff  - Apply yellow socks and bracelet for high fall risk patients  - Consider moving patient to room near nurses station  Outcome: Progressing  Goal: Maintain or return to baseline ADL function  Description: INTERVENTIONS:  -  Assess patient's ability to carry out ADLs; assess patient's baseline for ADL function and identify physical deficits which impact ability to perform ADLs (bathing, care of mouth/teeth, toileting, grooming, dressing, etc )  - Assess/evaluate cause of self-care deficits   - Assess range of motion  - Assess patient's mobility; develop plan if impaired  - Assess patient's need for assistive devices and provide as appropriate  - Encourage maximum independence but intervene and supervise when necessary  - Involve family in performance of ADLs  - Assess for home care needs following discharge   - Consider OT consult to assist with ADL evaluation and planning for discharge  - Provide patient education as appropriate  Outcome: Progressing  Goal: Maintains/Returns to pre admission functional level  Description: INTERVENTIONS:  - Perform BMAT or MOVE assessment daily    - Set and communicate daily mobility goal to care team and patient/family/caregiver     - Collaborate with rehabilitation services on mobility goals if consulted  - Out of bed for toileting  - Record patient progress and toleration of activity level   Outcome: Progressing     Problem: DISCHARGE PLANNING  Goal: Discharge to home or other facility with appropriate resources  Description: INTERVENTIONS:  - Identify barriers to discharge w/patient and caregiver  - Arrange for needed discharge resources and transportation as appropriate  - Identify discharge learning needs (meds, wound care, etc )  - Arrange for interpretive services to assist at discharge as needed  - Refer to Case Management Department for coordinating discharge planning if the patient needs post-hospital services based on physician/advanced practitioner order or complex needs related to functional status, cognitive ability, or social support system  Outcome: Progressing     Problem: ANTEPARTUM  Goal: Maintain pregnancy as long as maternal and/or fetal condition is stable  Description: INTERVENTIONS:  - Maternal surveillance  - Fetal surveillance  - Monitor uterine activity  - Medications as ordered  - Bedrest  Outcome: Progressing     Problem: BIRTH - VAGINAL/ SECTION  Goal: Fetal and maternal status remain reassuring during the birth process  Description: INTERVENTIONS:  - Monitor vital signs  - Monitor fetal heart rate  - Monitor uterine activity  - Monitor labor progression (vaginal delivery)  - DVT prophylaxis  - Antibiotic prophylaxis  Outcome: Progressing  Goal: Emotionally satisfying birthing experience for mother/fetus  Description: Interventions:  - Assess, plan, implement and evaluate the nursing care given to the patient in labor  - Advocate the philosophy that each childbirth experience is a unique experience and support the family's chosen level of involvement and control during the labor process   - Actively participate in both the patient's and family's teaching of the birth process  - Consider cultural, Confucianism and age-specific factors and plan care for the patient in labor  Outcome: Progressing     Problem: POSTPARTUM  Goal: Experiences normal postpartum course  Description: INTERVENTIONS:  - Monitor maternal vital signs  - Assess uterine involution and lochia  Outcome: Progressing  Goal: Appropriate maternal -  bonding  Description: INTERVENTIONS:  - Identify family support  - Assess for appropriate maternal/infant bonding   -Encourage maternal/infant bonding opportunities  - Referral to  or  as needed  Outcome: Progressing  Goal: Establishment of infant feeding pattern  Description: INTERVENTIONS:  - Assess breast/bottle feeding  - Refer to lactation as needed  Outcome: Progressing  Goal: Incision(s), wounds(s) or drain site(s) healing without S/S of infection  Description: INTERVENTIONS  - Assess and document dressing, incision, wound bed, drain sites and surrounding tissue  - Provide patient and family education  Outcome: Progressing

## 2022-01-17 NOTE — OB LABOR/OXYTOCIN SAFETY PROGRESS
Labor Progress Note - Cloteal Julia 36 y o  female MRN: [id number]    Unit/Bed#: -01 Encounter: [id number]                 Cervical Dilation: 2-3        Cervical Effacement: 60  Fetal Station: -2                        Vital Signs:   Vitals:    01/16/22 2102   BP: 132/85   Pulse: 85   Resp: 16   Temp: 98 1 °F (36 7 °C)           Notes/comments:   Claire balloon and cytotec place at this time  Will plan for recheck when claire balloon expelled  PROCEDURE:  CLAIRE BALLOON PLACEMENT    A 24F claire with a 30cc balloon was selected, SVE was performed and cervix was located, claire was introduced over sterile gloved hands  Balloon advanced through cervix beyond the internal cervical os  A small amount amount of sterile saline solution was instilled in the balloon to confirm placement  Placement was confirmed to be beyond the internal cervical os  A total of 60cc of sterile saline solution was placed into the balloon  Pt tolerated well  Instructions left with RN to place claire to gravity with a 1L bag of IV fluid  Notify MD when claire dislodged      MD Jameel Steiner MD 1/16/2022 9:54 PM

## 2022-01-17 NOTE — OB LABOR/OXYTOCIN SAFETY PROGRESS
Oxytocin Safety Progress Check Note - Nicki Joy 36 y o  female MRN: [id number]    Unit/Bed#: -01 Encounter: [id number]    Dose (keisha-units/min) Oxytocin: 6 keisha-units/min  Contraction Frequency (minutes): 1 5-3 5 with couplets  Contraction Quality: Moderate  Tachysystole: No   Cervical Dilation: 9        Cervical Effacement: 100  Fetal Station: 0  Baseline Rate: 130 bpm  Fetal Heart Rate: 132 BPM  FHR Category: Category I     Vital Signs:   Vitals:    01/17/22 1314   BP: 116/67   Pulse: 75   Resp:    Temp:    SpO2:      Notes/comments:     Lea Barrera is comfortable     Cervical exam is 9/100/0  Plan to position in high hughes's to help with descent  Discussed with Dr Yovani Sloan MD 1/17/2022 1:31 PM

## 2022-01-17 NOTE — OB LABOR/OXYTOCIN SAFETY PROGRESS
Labor Progress Note - Paris Rivera 36 y o  female MRN: [id number]    Unit/Bed#: -01 Encounter: [id number]       Contraction Frequency (minutes): 2  Contraction Quality: Mild  Tachysystole: No   Cervical Dilation: 5        Cervical Effacement: 80  Fetal Station: -1  Baseline Rate: 130 bpm  Fetal Heart Rate: 135 BPM  FHR Category: Category I               Vital Signs:   Vitals:    01/17/22 0136   BP: 130/83   Pulse: 80   Resp:    Temp:            Notes/comments:    4 hours after Cytotec  SVE 5/80/-1  FHT remained category 1  Will begin Pitocin titration at this time      Jey Amos MD 1/17/2022 1:53 AM

## 2022-01-17 NOTE — OB LABOR/OXYTOCIN SAFETY PROGRESS
Labor Progress Note - Ольга Delarosa 36 y o  female MRN: [id number]    Unit/Bed#: -01 Encounter: [id number]       Contraction Frequency (minutes): 3-10  Contraction Quality: Mild  Tachysystole: No   Cervical Dilation: 5        Cervical Effacement: 80  Fetal Station: -1  Baseline Rate: 130 bpm  Fetal Heart Rate: 160 BPM  FHR Category: Category I               Vital Signs:   Vitals:    01/16/22 2252   BP: 134/82   Pulse: 67   Resp:    Temp:            Notes/comments:    Notified of claire balloon out  SVE at this time 5/80/-1  FHT remains category I  Will continue to monitor       Kayla Lord MD 1/16/2022 10:53 PM

## 2022-01-17 NOTE — PLAN OF CARE
Problem: Knowledge Deficit  Goal: Verbalizes understanding of labor plan  Description: Assess patient/family/caregiver's baseline knowledge level and ability to understand information  Provide education via patient/family/caregiver's preferred learning method at appropriate level of understanding  1  Provide teaching at level of understanding  2  Provide teaching via preferred learning method(s)  Outcome: Progressing  Goal: Patient/family/caregiver demonstrates understanding of disease process, treatment plan, medications, and discharge instructions  Description: Complete learning assessment and assess knowledge base  Interventions:  - Provide teaching at level of understanding  - Provide teaching via preferred learning methods  Outcome: Progressing     Problem: Labor & Delivery  Goal: Manages discomfort  Description: Assess and monitor for signs and symptoms of discomfort  Assess patient's pain level regularly and per hospital policy  Administer medications as ordered  Support use of nonpharmacological methods to help control pain such as distraction, imagery, relaxation, and application of heat and cold  Collaborate with interdisciplinary team and patient to determine appropriate pain management plan  1  Include patient in decisions related to comfort  2  Offer non-pharmacological pain management interventions  3  Report ineffective pain management to physician  Outcome: Progressing  Goal: Patient vital signs are stable  Description: 1  Assess vital signs - vaginal delivery    Outcome: Progressing     Problem: PAIN - ADULT  Goal: Verbalizes/displays adequate comfort level or baseline comfort level  Description: Interventions:  - Encourage patient to monitor pain and request assistance  - Assess pain using appropriate pain scale  - Administer analgesics based on type and severity of pain and evaluate response  - Implement non-pharmacological measures as appropriate and evaluate response  - Consider cultural and social influences on pain and pain management  - Notify physician/advanced practitioner if interventions unsuccessful or patient reports new pain  Outcome: Progressing     Problem: INFECTION - ADULT  Goal: Absence or prevention of progression during hospitalization  Description: INTERVENTIONS:  - Assess and monitor for signs and symptoms of infection  - Monitor lab/diagnostic results  - Monitor all insertion sites, i e  indwelling lines, tubes, and drains  - Monitor endotracheal if appropriate and nasal secretions for changes in amount and color  - [place] appropriate cooling/warming therapies per order  - Administer medications as ordered  - Instruct and encourage patient and family to use good hand hygiene technique  - Identify and instruct in appropriate isolation precautions for identified infection/condition  Outcome: Progressing  Goal: Absence of fever/infection during neutropenic period  Description: INTERVENTIONS:  - Monitor WBC    Outcome: Progressing     Problem: SAFETY ADULT  Goal: Patient will remain free of falls  Description: INTERVENTIONS:  - Educate patient/family on patient safety including physical limitations  - Instruct patient to call for assistance with activity   - Consult OT/PT to assist with strengthening/mobility   - Keep Call bell within reach  - Keep bed low and locked with side rails adjusted as appropriate  - Keep care items and personal belongings within reach  - Initiate and maintain comfort rounds  - Make Fall Risk Sign visible to staff  - Apply yellow socks and bracelet for high fall risk patients  - Consider moving patient to room near nurses station  Outcome: Progressing  Goal: Maintain or return to baseline ADL function  Description: INTERVENTIONS:  -  Assess patient's ability to carry out ADLs; assess patient's baseline for ADL function and identify physical deficits which impact ability to perform ADLs (bathing, care of mouth/teeth, toileting, grooming, dressing, etc )  - Assess/evaluate cause of self-care deficits   - Assess range of motion  - Assess patient's mobility; develop plan if impaired  - Assess patient's need for assistive devices and provide as appropriate  - Encourage maximum independence but intervene and supervise when necessary  - Involve family in performance of ADLs  - Assess for home care needs following discharge   - Consider OT consult to assist with ADL evaluation and planning for discharge  - Provide patient education as appropriate  Outcome: Progressing  Goal: Maintains/Returns to pre admission functional level  Description: INTERVENTIONS:  - Perform BMAT or MOVE assessment daily    - Set and communicate daily mobility goal to care team and patient/family/caregiver     - Collaborate with rehabilitation services on mobility goals if consulted    - Out of bed for toileting  - Record patient progress and toleration of activity level   Outcome: Progressing     Problem: DISCHARGE PLANNING  Goal: Discharge to home or other facility with appropriate resources  Description: INTERVENTIONS:  - Identify barriers to discharge w/patient and caregiver  - Arrange for needed discharge resources and transportation as appropriate  - Identify discharge learning needs (meds, wound care, etc )  - Arrange for interpretive services to assist at discharge as needed  - Refer to Case Management Department for coordinating discharge planning if the patient needs post-hospital services based on physician/advanced practitioner order or complex needs related to functional status, cognitive ability, or social support system  Outcome: Progressing     Problem: ANTEPARTUM  Goal: Maintain pregnancy as long as maternal and/or fetal condition is stable  Description: INTERVENTIONS:  - Maternal surveillance  - Fetal surveillance  - Monitor uterine activity  - Medications as ordered  - Bedrest  Outcome: Progressing     Problem: BIRTH - VAGINAL/ SECTION  Goal: Fetal and maternal status remain reassuring during the birth process  Description: INTERVENTIONS:  - Monitor vital signs  - Monitor fetal heart rate  - Monitor uterine activity  - Monitor labor progression (vaginal delivery)  - DVT prophylaxis  - Antibiotic prophylaxis  Outcome: Progressing  Goal: Emotionally satisfying birthing experience for mother/fetus  Description: Interventions:  - Assess, plan, implement and evaluate the nursing care given to the patient in labor  - Advocate the philosophy that each childbirth experience is a unique experience and support the family's chosen level of involvement and control during the labor process   - Actively participate in both the patient's and family's teaching of the birth process  - Consider cultural, Yazdanism and age-specific factors and plan care for the patient in labor  Outcome: Progressing     Problem: POSTPARTUM  Goal: Experiences normal postpartum course  Description: INTERVENTIONS:  - Monitor maternal vital signs  - Assess uterine involution and lochia  Outcome: Progressing  Goal: Appropriate maternal -  bonding  Description: INTERVENTIONS:  - Identify family support  - Assess for appropriate maternal/infant bonding   -Encourage maternal/infant bonding opportunities  - Referral to  or  as needed  Outcome: Progressing  Goal: Establishment of infant feeding pattern  Description: INTERVENTIONS:  - Assess breast/bottle feeding  - Refer to lactation as needed  Outcome: Progressing  Goal: Incision(s), wounds(s) or drain site(s) healing without S/S of infection  Description: INTERVENTIONS  - Assess and document dressing, incision, wound bed, drain sites and surrounding tissue  - Provide patient and family education  - Perform skin care/dressing changes every   Outcome: Progressing

## 2022-01-17 NOTE — OB LABOR/OXYTOCIN SAFETY PROGRESS
Labor Progress Note - Gal Mckeon 36 y o  female MRN: [id number]    Unit/Bed#: -01 Encounter: [id number]    Dose (keisha-units/min) Oxytocin: 6 keisha-units/min  Contraction Frequency (minutes): 1 5-3  Contraction Quality: Moderate  Tachysystole: No   Cervical Dilation: 10  Dilation Complete Date: 22     Cervical Effacement: 100  Fetal Station: 1  Baseline Rate: 135 bpm  Fetal Heart Rate: 161 BPM  FHR Category: Category I               Vital Signs:   Vitals:    22 1658   BP: 136/77   Pulse: 75   Resp:    Temp:    SpO2:            Notes/comments:      Took over care of Abiola 17:00 2022  Complete +1, pushing  At bedside with Nile Charter, good effort and movement  ROP, plan to push on side  Discussed risk of shoulder dystocia  Reviewed maneuvers used to resolve shoulder dystocia, possibility of moving support person out to better perform maneuvers  Did discuss possible emergent  section if maneuvers fail  Discussed risk of shoulder dystocia, effect on baby's brain as not receiving oxygen throughout maneuvers, risk of permanent musculoskeletal, nerve, and brain damage  Expressed understanding - had previously reviewed in office      Gasper Chinchilla MD 2022 5:37 PM

## 2022-01-17 NOTE — OB LABOR/OXYTOCIN SAFETY PROGRESS
Oxytocin Safety Progress Check Note - Ольга Delarosa 36 y o  female MRN: [id number]    Unit/Bed#: -01 Encounter: [id number]    Dose (keisha-units/min) Oxytocin: 4 keisha-units/min  Contraction Frequency (minutes): 1 5-5  Contraction Quality: Moderate  Tachysystole: No   Cervical Dilation: 7        Cervical Effacement: 80  Fetal Station: -1  Baseline Rate: 130 bpm  Fetal Heart Rate: 120 BPM  FHR Category: Category I               Vital Signs:   Vitals:    01/17/22 0604   BP: 129/72   Pulse: 85   Resp:    Temp:    SpO2:            Notes/comments:    SVE at this time 7/80/-1  FHT remains category I  Will plan to continue pitocin titration      Kayla Lord MD 1/17/2022 6:07 AM

## 2022-01-17 NOTE — ANESTHESIA PROCEDURE NOTES
Epidural Block    Patient location during procedure: OB  Start time: 1/17/2022 3:47 AM  Reason for block: procedure for pain and at surgeon's request  Staffing  Performed: Anesthesiologist and CRNA   Anesthesiologist: Collin Neves MD  Resident/CRNA: Randie Apley, CRNA  Preanesthetic Checklist  Completed: patient identified, IV checked, site marked, risks and benefits discussed, surgical consent, monitors and equipment checked, pre-op evaluation and timeout performed  Epidural  Patient position: sitting  Prep: ChloraPrep  Patient monitoring: cardiac monitor and frequent blood pressure checks  Approach: midline  Location: lumbar  Injection technique: RONALD air  Needle  Needle type: Tuohy   Needle gauge: 18 G  Catheter type: side hole  Catheter size: 20 G  Catheter at skin depth: 11 cm  Catheter securement method: surgical tape  Test dose: negativelidocaine 1 5% with epinephrine 1:200,000 test dose, 5 mL  ropivacaine (NAROPIN) 0 2% epidural injection, 6 mL  lidocaine (PF) (XYLOCAINE-MPF) 1 % infiltration, 3 mL  Assessment  Sensory level: T10  Number of attempts: 1negative aspiration for CSF, negative aspiration for heme and no paresthesia on injection  patient tolerated the procedure well with no immediate complications

## 2022-01-17 NOTE — OB LABOR/OXYTOCIN SAFETY PROGRESS
Oxytocin Safety Progress Check Note - Deneise Copier 36 y o  female MRN: [id number]    Unit/Bed#: -01 Encounter: [id number]    Dose (keisha-units/min) Oxytocin: 6 keisha-units/min  Contraction Frequency (minutes): 2-3  Contraction Quality: Moderate  Tachysystole: No   Cervical Dilation: 7-8        Cervical Effacement: 100  Fetal Station: 0  Baseline Rate: 135 bpm  Fetal Heart Rate: 122 BPM  FHR Category: Category I     Vital Signs:   Vitals:    01/17/22 1059   BP: 116/58   Pulse: 73   Resp:    Temp:    SpO2:      Notes/comments:     Made change to 7 5/100/0  FHT cat 1, continue titrating pitocin   Discussed with Dr Lj Sanderson MD 1/17/2022 11:18 AM

## 2022-01-17 NOTE — OB LABOR/OXYTOCIN SAFETY PROGRESS
Oxytocin Safety Progress Check Note - Cloteal Julia 36 y o  female MRN: [id number]    Unit/Bed#: -01 Encounter: [id number]    Dose (keisha-units/min) Oxytocin: 6 keisha-units/min  Contraction Frequency (minutes): 2-4  Contraction Quality: Moderate  Tachysystole: No   Cervical Dilation: 10  Dilation Complete Date: 01/17/22     Cervical Effacement: 100  Fetal Station: 1  Baseline Rate: 135 bpm  Fetal Heart Rate: 131 BPM  FHR Category: Category I               Vital Signs:   Vitals:    01/17/22 1610   BP: 122/75   Pulse: 90   Resp: 18   Temp: 97 6 °F (36 4 °C)   SpO2:            Notes/comments:      Complete, plan to start pushing     Discussed with Dr Joseph Deluca MD 1/17/2022 4:22 PM

## 2022-01-17 NOTE — OB LABOR/OXYTOCIN SAFETY PROGRESS
Oxytocin Safety Progress Check Note - Basil Olmstead 36 y o  female MRN: [id number]    Unit/Bed#: -01 Encounter: [id number]    Dose (keisha-units/min) Oxytocin: 4 keisha-units/min  Contraction Frequency (minutes): 1 5-4 with couplets  Contraction Quality: Moderate  Tachysystole: No   Cervical Dilation: 7        Cervical Effacement: 90  Fetal Station: 0  Baseline Rate: 125 bpm  Fetal Heart Rate: 128 BPM  FHR Category: Category I     Vital Signs:   Vitals:    01/17/22 0715   BP: 126/75   Pulse: 76   Resp: 15   Temp: 97 5 °F (36 4 °C)   SpO2:      Notes/comments:      Nadeem Calvo is comfortable with her epidural    Cervical exam is 7/90/0  AROM for clear fluid  FHT is category I  Plan to continue to titrate pitocin and reevaluate in 2 hours, or as clinically indicated  Discussed with Dr Ronald Monteiro MD 1/17/2022 8:16 AM

## 2022-01-18 LAB — RUBV IGG SERPL IA-ACNC: 136.9 IU/ML

## 2022-01-18 PROCEDURE — 86762 RUBELLA ANTIBODY: CPT

## 2022-01-18 PROCEDURE — 99024 POSTOP FOLLOW-UP VISIT: CPT | Performed by: OBSTETRICS & GYNECOLOGY

## 2022-01-18 RX ADMIN — DOCUSATE SODIUM 100 MG: 100 CAPSULE ORAL at 17:31

## 2022-01-18 RX ADMIN — DOCUSATE SODIUM 100 MG: 100 CAPSULE ORAL at 09:21

## 2022-01-18 RX ADMIN — IBUPROFEN 600 MG: 600 TABLET ORAL at 15:33

## 2022-01-18 RX ADMIN — ACETAMINOPHEN 650 MG: 325 TABLET, FILM COATED ORAL at 12:39

## 2022-01-18 RX ADMIN — IBUPROFEN 600 MG: 600 TABLET ORAL at 03:36

## 2022-01-18 RX ADMIN — IBUPROFEN 600 MG: 600 TABLET ORAL at 09:20

## 2022-01-18 RX ADMIN — ACETAMINOPHEN 650 MG: 325 TABLET, FILM COATED ORAL at 18:37

## 2022-01-18 RX ADMIN — ACETAMINOPHEN 650 MG: 325 TABLET, FILM COATED ORAL at 00:39

## 2022-01-18 RX ADMIN — IBUPROFEN 600 MG: 600 TABLET ORAL at 21:47

## 2022-01-18 NOTE — PLAN OF CARE
Problem: PAIN - ADULT  Goal: Verbalizes/displays adequate comfort level or baseline comfort level  Description: Interventions:  - Encourage patient to monitor pain and request assistance  - Assess pain using appropriate pain scale  - Administer analgesics based on type and severity of pain and evaluate response  - Implement non-pharmacological measures as appropriate and evaluate response  - Consider cultural and social influences on pain and pain management  - Notify physician/advanced practitioner if interventions unsuccessful or patient reports new pain  Outcome: Progressing     Problem: INFECTION - ADULT  Goal: Absence or prevention of progression during hospitalization  Description: INTERVENTIONS:  - Assess and monitor for signs and symptoms of infection  - Monitor lab/diagnostic results  - Monitor all insertion sites, i e  indwelling lines, tubes, and drains  - Monitor endotracheal if appropriate and nasal secretions for changes in amount and color  - [place] appropriate cooling/warming therapies per order  - Administer medications as ordered  - Instruct and encourage patient and family to use good hand hygiene technique  - Identify and instruct in appropriate isolation precautions for identified infection/condition  Outcome: Progressing  Goal: Absence of fever/infection during neutropenic period  Description: INTERVENTIONS:  - Monitor WBC    Outcome: Progressing     Problem: SAFETY ADULT  Goal: Patient will remain free of falls  Description: INTERVENTIONS:  - Educate patient/family on patient safety including physical limitations  - Instruct patient to call for assistance with activity   - Consult OT/PT to assist with strengthening/mobility   - Keep Call bell within reach  - Keep bed low and locked with side rails adjusted as appropriate  - Keep care items and personal belongings within reach  - Initiate and maintain comfort rounds  - Make Fall Risk Sign visible to staff  - Apply yellow socks and bracelet for high fall risk patients  - Consider moving patient to room near nurses station  Outcome: Progressing  Goal: Maintain or return to baseline ADL function  Description: INTERVENTIONS:  -  Assess patient's ability to carry out ADLs; assess patient's baseline for ADL function and identify physical deficits which impact ability to perform ADLs (bathing, care of mouth/teeth, toileting, grooming, dressing, etc )  - Assess/evaluate cause of self-care deficits   - Assess range of motion  - Assess patient's mobility; develop plan if impaired  - Assess patient's need for assistive devices and provide as appropriate  - Encourage maximum independence but intervene and supervise when necessary  - Involve family in performance of ADLs  - Assess for home care needs following discharge   - Consider OT consult to assist with ADL evaluation and planning for discharge  - Provide patient education as appropriate  Outcome: Progressing  Goal: Maintains/Returns to pre admission functional level  Description: INTERVENTIONS:  - Perform BMAT or MOVE assessment daily    - Set and communicate daily mobility goal to care team and patient/family/caregiver     - Collaborate with rehabilitation services on mobility goals if consulted  - Out of bed for toileting  - Record patient progress and toleration of activity level   Outcome: Progressing     Problem: DISCHARGE PLANNING  Goal: Discharge to home or other facility with appropriate resources  Description: INTERVENTIONS:  - Identify barriers to discharge w/patient and caregiver  - Arrange for needed discharge resources and transportation as appropriate  - Identify discharge learning needs (meds, wound care, etc )  - Arrange for interpretive services to assist at discharge as needed  - Refer to Case Management Department for coordinating discharge planning if the patient needs post-hospital services based on physician/advanced practitioner order or complex needs related to functional status, cognitive ability, or social support system  Outcome: Progressing     Problem: POSTPARTUM  Goal: Experiences normal postpartum course  Description: INTERVENTIONS:  - Monitor maternal vital signs  - Assess uterine involution and lochia  Outcome: Progressing  Goal: Appropriate maternal -  bonding  Description: INTERVENTIONS:  - Identify family support  - Assess for appropriate maternal/infant bonding   -Encourage maternal/infant bonding opportunities  - Referral to  or  as needed  Outcome: Progressing  Goal: Establishment of infant feeding pattern  Description: INTERVENTIONS:  - Assess breast/bottle feeding  - Refer to lactation as needed  Outcome: Progressing  Goal: Incision(s), wounds(s) or drain site(s) healing without S/S of infection  Description: INTERVENTIONS  - Assess and document dressing, incision, wound bed, drain sites and surrounding tissue  - Provide patient and family education  Outcome: Progressing

## 2022-01-18 NOTE — LACTATION NOTE
This note was copied from a baby's chart  CONSULT - LACTATION  Baby Boy Catalino Turpin) Cassandra López 1 days male MRN: [id number]    [place] [facility] Room / Bed: (N)/(N) Encounter: [id number]    Maternal Information     MOTHER:  Flynn Monsivais  Maternal Age: 36 y o    OB History: # 1 - Date: [birth year], Sex: Male, Weight: 4060 g (8 lb 15 2 oz), GA: 39w2d, Delivery: Vaginal, Spontaneous, Apgar1: 8, Apgar5: 9, Living: Living, Birth Comments: None   Previouse breast reduction surgery? No    Lactation history:   Has patient previously breast fed: No   How long had patient previously breast fed:     Previous breast feeding complications:       Past Surgical History:   Procedure Laterality Date    WISDOM TOOTH EXTRACTION          Birth information:  YOB: 2020   Time of birth: 7:18 PM   Sex: male   Delivery type: Vaginal, Spontaneous   Birth Weight: 4060 g (8 lb 15 2 oz)   Percent of Weight Change: 0%     Gestational Age: 44w2d   [unfilled]    Assessment     Breast and nipple assessment: asymmetrical breasts with the right larger than the left  Soft, pendulous with darker, medium areolas and small nipples that oscar with stimulation     Assessment: receded chin and asymmetrical jaw with pursed lips and tight upper /lower mandibles    Feeding assessment: latched in laid back, active, coordinated sucking bursts 3-5 sucks per burst  LATCH:  Latch: Repeated attempts, hold nipple in mouth, stimulate to suck   Audible Swallowing: A few with stimulation   Type of Nipple: Everted (After stimulation)   Comfort (Breast/Nipple): Soft/non-tender   Hold (Positioning): Partial assist, teach one side, mother does other, staff holds   [facility] Score: 7          Feeding recommendations:  breast feed on demand  Mom is pumping / hand expressing into baby's mouth  Education on alignment of nipple to nose, drag down to chin   Feed s2s, demonstration of hand expression, breast compressions and alignment of nipple to nose, drag down to chin to achieve a wide, deep latch  great HE +++    Placed in laid back on right breast  With hand expression, Boby Alanis opened his mouth, shallow latch with few sucks on the breast  After approx  15 min  Moved to left breast, Latched better on left breast as nipple everts more  Active, coordinated sucking  Provided education on alignment of nose to breast; bring baby to breast and not breast to baby; support head with opp  Hand in cross cradle; use pillows to lift baby to be belly to belly; ear, shoulder, hip alignment; Support mother's back and place self in comfortable position to support bringing baby to the breast  Shoulders should be down and away from ears  Provided demonstration, education and support of deep latch to breast by placing the nipple to the nose, dragging down to chin to achieve a wide latch  Bring baby to the breast, not breast to baby  Move your shoulders down and away from your ears  Look for ear, shoulder, hip alignment  Baby's upper and lower lip should be flanged on the breast     Mom has an S2 pump at home    Reviewed RSB/DC book    Enc  To call lactation for next latch  Information on hand expression given  Discussed benefits of knowing how to manually express breast including stimulating milk supply, softening nipple for latch and evacuating breast in the event of engorgement  Mom is encouraged to place baby skin to skin for feedings  Skin to skin education provided for baby placement on mother's chest, baby only in diaper, blankets below shoulders on baby's back  Skin to skin is encouraged to continue at home for feedings and between feedings  Worked on positioning infant up at chest level and starting to feed infant with nose arriving at the nipple  Then, using areolar compression to achieve a deep latch that is comfortable and exchanges optimum amounts of milk       - Start feedings on breast that last feeding ended   - allow no more than 3 hours between breast feeding sessions   - time between feedings is counted from the beginning of the first feed to the beginning of the next feeding session    Reviewed early signs of hunger, including tensing of hands and shoulders - no need to wait for open eyes  Crying is a late hunger sign  If baby is crying, soothe baby first and then attempt to latch  Reviewed normal sucking patterns: transition from stimulation to nutritive to release or non-nutritive  The goal is to see and hear lots of swallowing  Reviewed normal nursing pattern: infant could latch on one breast up to 30 minutes or until releases on own  Signs of satiation is open hand with fingers that do not grab your finger  Discussed difference in sensation of non-nutritive v nutritive sucking    Met with mother  Provided mother with Ready, Set, Baby booklet  Discussed Skin to Skin contact an benefits to mom and baby  Talked about the delay of the first bath until baby has adjusted  Spoke about the benefits of rooming in  Feeding on cue and what that means for recognizing infant's hunger  Avoidance of pacifiers for the first month discussed  Talked about exclusive breastfeeding for the first 6 months  Positioning and latch reviewed as well as showing images of other feeding positions  Discussed the properties of a good latch in any position  Reviewed hand/manual expression  Discussed s/s that baby is getting enough milk and some s/s that breastfeeding dyad may need further help  Gave information on common concerns, what to expect the first few weeks after delivery, preparing for other caregivers, and how partners can help  Resources for support also provided  Encouraged parents to call for assistance, questions, and concerns about breastfeeding  Extension provided  Met with mother to go over discharge breastfeeding booklet including the feeding log   Emphasized 8 or more (12) feedings in a 24 hour period, what to expect for the number of diapers per day of life and the progression of properties of the  stooling pattern  Reviewed breastfeeding and your lifestyle, storage and preparation of breast milk, how to keep you breast pump clean, the employed breastfeeding mother and paced bottle feeding handouts  Booklet included Breastfeeding Resources for after discharge including access to the number for the [address]  Provided education on growth spurts, when to introduce bottles; paced bottle feeding, and non-nutritive suck at the breast  Provided education on Signs of satiation  Encouraged to call lactation to observe a latch prior to discharge for reassurance  Encouraged to call baby and me with any questions and closely monitor output      [place], [address] [place] 2022 12:23 PM

## 2022-01-18 NOTE — DISCHARGE INSTRUCTIONS
Self Care After Delivery   AMBULATORY CARE:   The postpartum period is the period of time from delivery to about 6 weeks  During this time you may experience many physical and emotional changes  It is important to understand what is normal and when you need to call your healthcare provider  It is also important to know how to care for yourself during this time  Call your local emergency number (911 in the [address]) for any of the following:   · You see or hear things that are not there, or have thoughts of harming yourself or your baby  · You soak through 1 pad in 15 minutes, have blurry vision, clammy or pale skin, and feel faint  · You faint or lose consciousness  · You have trouble breathing  · You cough up blood  · Your  incision comes apart  Seek care immediately if:   · Your heart is beating faster than usual      · You have a bad headache or changes in your vision  · Your perineal tear, episiotomy site, or  incision is red, swollen, bleeding, or draining pus  · You have severe abdominal pain  Call your doctor or obstetrician if:   · Your leg is painful, red, and larger than usual      · You soak through 1 or more pads in an hour, or pass blood clots larger than a quarter from your vagina  · You have a fever  · You have new or worsening pain in your abdomen or vagina  · You continue to have depression 1 to 2 weeks after you deliver  · You have trouble sleeping  · You have foul-smelling discharge from your vagina  · You have pain or burning when you urinate  · You do not have a bowel movement for 3 days or more  · You have nausea or are vomiting  · You have hard lumps or red streaks over your breasts  · You have cracked nipples or bleed from your nipples  · You have questions or concerns about your condition or care  Physical changes:  The following are normal changes after you give birth:  · Pain in the area between your anus and vagina     · Breast pain     · Constipation or hemorrhoids     · Hot or cold flashes     · Vaginal bleeding or discharge     · Mild to moderate abdominal cramping     · Difficulty controlling bowel movements or urine     Emotional changes: A drop in hormone levels after you deliver may cause changes in your emotions  You may feel irritable, sad, or anxious  You may cry easily or for no reason  You may also feel depressed  Depression that continues can be a sign of postpartum depression, a condition that can be treated  Treatment may include talk therapy, medicines, or both  Healthcare providers will ask how you are feeling and if you have any depression  These talks can happen during appointments for your medical care and for your baby's care, such as well child visits  Providers can help you find ways to care for yourself and your baby  Talk to your providers about the following:  · When emotional changes or depression started, and if it is getting worse over time     · Problems you are having with daily activities, sleep, or caring for your baby     · If anything makes you feel worse, or makes you feel better     · Feeling that you are not bonding with your baby the way you want     · Any problems your baby has with sleeping or feeding     · Your baby is fussy or cries a lot     · Support you have from friends, family, or others     Breast care for breastfeeding mothers: You may have sore breasts for 3 to 6 days after you give birth  This happens as your milk begins to fill your breasts  You may also have sore breasts if you do not breastfeed frequently  Do the following to care for your breasts:  · Apply a moist, warm, compress to your breast as directed  This may help soothe your breasts  Make sure the washcloth is not too hot before you apply it to your breast      · Nurse your baby or pump your milk frequently  This may prevent clogged milk ducts   Ask your healthcare provider how often to nurse or pump  · Massage your breasts as directed  This may help increase your milk flow  Gently rub your breasts in a circular motion before you breastfeed  You may need to gently squeeze your breast or nipple to help release milk  You can also use a breast pump to help release milk from your breast      · Wash your breasts with warm water only  Do not put soap on your nipples  Soap may cause your nipples to become dry  · Apply lanolin cream to your nipples as directed  Lanolin cream may add moisture to your skin and prevent nipple dryness  Always wash off lanolin cream with warm water before you breastfeed  · Place pads in your bra  Your nipples may leak milk when you are not breastfeeding  You can place pads inside of your bra to help prevent leaking onto your clothing  Ask your healthcare provider where to purchase bra pads  · Get breastfeeding support if needed  Healthcare providers can answer questions about breastfeeding and provide you with support  Ask your healthcare provider who you can contact if you need breastfeeding support  Breast care for non-breastfeeding mothers: Milk will fill your breasts even if you bottle feed your baby  Do the following to help stop your milk from filling your breasts and causing pain:  · Wear a bra with support at all times  A sports bra or a tight-fitting bra will help stop your milk from coming in  · Apply ice on each breast for 15 to 20 minutes every hour or as directed  Use an ice pack, or put crushed ice in a plastic bag  Cover it with a towel before you apply it to your breast  Ice helps your milk ducts shrink  · Keep your breasts away from warm water  Warm water will make it easier for milk to fill your breasts  Stand with your breasts away from warm water in the shower  · Limit how much you touch your breasts  This will prevent them from filling with milk  Perineum care: Your perineum is the area between your rectum and vagina   It is normal to have swelling and pain in this area after you give birth  If you had an episiotomy, your healthcare provider may give you special instructions  · Clean your perineum after you use the bathroom  This may prevent infection and help with healing  Use a spray bottle with warm water to clean your perineum  You may also gently spray warm water against your perineum when you urinate  Always wipe front to back  · Take a sitz bath as directed  A sitz bath may help relieve swelling and pain  Fill your bath tub or bucket with water up to your hips and sit in the water  Use cold water for 2 days after you deliver  Then use warm water  Ask your healthcare provider for more information about a sitz bath  · Apply ice packs for the first 24 hours or as directed  Use a plastic glove filled with ice or buy an ice pack  Wrap the ice pack or plastic glove in a small towel or wash cloth  Place the ice pack on your perineum for 20 minutes at a time  · Sit on a donut-shaped pillow  This may relieve pressure on your perineum when you sit  · Use wipes that contain medicine or take pills as directed  Your healthcare provider may tell you to use witch hazel pads  You can place witch hazel pads in the refrigerator before you apply them to your perineum  Your provider may also tell you to take NSAIDs  Ask him or her how often to take pills or use the wipes  · Do not go swimming or take tub baths for 4 to 6 weeks or as directed  This will help prevent an infection in your vagina or uterus  Bowel and bladder care: It may take 3 to 5 days to have a bowel movement after you deliver your baby  You can do the following to prevent or manage constipation, and get control of your bowel or bladder:  · Take stool softeners as directed  A stool softener is medicine that will make your bowel movements softer  This may prevent or relieve constipation  A stool softener may also make bowel movements less painful       · Drink plenty of liquids  Ask how much liquid to drink each day and which liquids are best for you  Liquids may help prevent constipation  · Eat foods high in fiber  Examples include fruits, vegetables, grains, beans, and lentils  Ask your healthcare provider how much fiber you need each day  Fiber may prevent constipation  · Do Kegel exercises as directed  Kegel exercises will help strengthen the muscles that control bowel movements and urination  Ask your healthcare provider for more information on Kegel exercises  · Apply cold compresses or medicine to hemorrhoids as directed  This may relieve swelling and pain  Your healthcare provider may tell you to apply ice or wipes that contain medicine to your hemorrhoids  He or she may also tell you to use a sitz bath  Ask your provider for more information on how to manage hemorrhoids  Nutrition: Good nutrition is important in the postpartum period  It will help you return to a healthy weight, increase your energy levels, and prevent constipation  It will also help you get enough nutrients and calories if you are going to breastfeed your baby  · Eat a variety of healthy foods  Healthy foods include fruits, vegetables, whole-grain breads, low-fat dairy products, beans, lean meats, and fish  You may need 500 to 700 extra calories each day if you breastfeed your baby  You may also need extra protein  · Limit foods with added sugar and high amounts of fat  These foods are high in calories and low in healthy nutrients  Read food labels so you know how much sugar and fat is in the food you want to eat  · Drink 8 to 10 glasses of water per day  Water will help you make plenty of milk for your baby  It will also help prevent constipation  Drink a glass of water every time you breastfeed your baby  · Take vitamins as directed  Ask your healthcare provider what vitamins you need  · Limit caffeine and alcohol if you are breastfeeding   Caffeine and alcohol can get into your breast milk  Caffeine and alcohol can make your baby fussy  They can also interfere with your baby's sleep  Ask your healthcare provider if you can drink alcohol or caffeine  Rest and sleep: You may feel very tired in the postpartum period  Enough sleep will help you heal and give you energy to care for your baby  The following may help you get sleep and rest:  · Nap when your baby naps  Your baby may nap several times during the day  Get rest during this time  · Limit visitors  Many people may want to see you and your baby  Ask friends or family to visit on different days  This will give you time to rest      · Do not plan too much for one day  Put off household chores so that you have time to rest  Gradually do more each day  · Ask for help from family, friends, or neighbors  Ask them to help you with laundry, cleaning, or errands  Also ask someone to watch the baby while you take a nap or relax  Ask your partner to help with the care of your baby  Pump some of your breast milk so your partner can feed your baby during the night  Exercise after delivery: Wait until your healthcare provider says it is okay to exercise  Exercise can help you lose weight, increase your energy levels, and manage your mood  It can also prevent constipation and blood clots  Start with gentle exercises such as walking  Do more as you have more energy  You may need to avoid abdominal exercises for 1 to 2 weeks after you deliver  Talk to your healthcare provider about an exercise plan that is right for you  Sexual activity after delivery:   · Do not have sex until your healthcare provider says it is okay  You may need to wait 4 to 6 weeks before you have sex  This may prevent infection and allow time to heal      · Your menstrual cycle may begin as soon as 3 weeks after you deliver  Your period may be delayed if you breastfeed your baby   You can become pregnant before you get your first postpartum period  Talk to your healthcare provider about birth control that is right for you  Some types of birth control are not safe during breastfeeding  For support and more information: Join a support group for new mothers  Ask for help from family and friends with chores, errands, and care of your baby  · Office of Women's Health,  Department of Health and Human Services  [address], [address] [facility] [place] [address]  [address], [address] [facility]  uche [place] [facility] [address]  Phone: 0- [phone] - [phone]  Web Address: www womenshealth gov  · [facility] of [facility] Postpartum [address] [facility] , [address] [facility]  [address] [facility] , [address] [place] [address]  Web Address: Treater be  [email or web address]/[email or web address]-care  aspx  Follow up with your doctor or obstetrician as directed: You will need to follow up within 2 to 6 weeks of delivery  Write down your questions so you remember to ask them at your visits  © Copyright [address] Information is for End User's use only and may not be sold, redistributed or otherwise used for commercial purposes  All illustrations and images included in CareNotes® are the copyrighted property of [employer]  or [address] [place], [place] [address]   The above information is an  only  It is not intended as medical advice for individual conditions or treatments  Talk to your doctor, nurse or pharmacist before following any medical regimen to see if it is safe and effective for you

## 2022-01-18 NOTE — L&D DELIVERY NOTE
Vaginal Delivery Summary - OB/GYN   Yaneth Foy 36 y o  female MRN: [id number]  Unit/Bed#: -01 Encounter: [id number]          Predelivery Diagnosis:  1  Pregnancy at 39 weeks of gestation   2  Herpes   3  PCOS  4  Asthma   5  Gestational Hypertension     Postdelivery Diagnosis:  1  Same as above  2  Delivery of term     Procedure: Spontaneous Vaginal Delivery, manual extraction of a placenta, repair of second degree laceration    Attending: Medhat Paci    Assistant: Anthony Iqbal    Anesthesia: Epidural    QBL: 222cc  Admission Hg: 10 7  Admission platelets: 198    Complications: none apparent    Specimens: cord blood, arterial and venous cord blood gasses, placenta to storage    Findings:   1  Viable male at 18, with APGARS of 8 and 9 at 1 and 5 minutes respectively,  2  Spontaneous delivery of intact placenta   3  2 degree laceration repaired with 3-0 vicryl rapide  4  Blood gases:    Umbilical Cord Venous Blood Gas:  Results from last 7 days   Lab Units 22  193   PH COV  7 339   PCO2 COV mm HG 35 7   HCO3 COV mmol/L 18 8   BASE EXC COV mmol/L -6 2*   O2 CT CD VB mL/dL 16 6   O2 HGB, VENOUS CORD % 50 9     Umbilical Cord Arterial Blood Gas:  Results from last 7 days   Lab Units 22  193   PH COA  7 180*   PCO2 COA  52 4   PO2 COA mm HG 13 0   HCO3 COA mmol/L 19 1   BASE EXC COA mmol/L -9 5*   O2 CONTENT CORD ART ml/dl 4 5   O2 HGB, ARTERIAL CORD % 21 6       Disposition:  Patient tolerated the procedure well and was recovering in labor and delivery room     Brief history and labor course:  Ms Yaneth Foy is a 36 y o   at 36wk3d  She presented to labor and delivery for elective induction of labor  On arrival she was found to be 2-3/60/-2  A speculum exam was performed secondary to her known HSV status and no herpetic lesions were noted  A claire balloon was placed and she received a dose of cytotec   She was noted to have elevated blood pressures and met criteria for gestational hypertension  Her CBC and CMP were both noted to be within normal limits and she had no severe range blood pressures requiring treatment  She made change to 5/80/-1 and a low dose pitocin titration was started  She received an epidural for analgesia  She was AROM'd for clear fluid  She progressed to complete and started pushing  Description of procedure    After pushing for 169 minutes, at [facility] 12 patient delivered a viable male , wt pending, apgars of 8 (1 min) and 9 (5 min)  The fetal vertex delivered spontaneously  Baby was checked for nuchal  No nuchal cord was noted  The left shoulder was noted to be anterior and delivered atraumatically with maternal expulsive forces and the assistance of gentle downward traction  The posterior shoulder delivered with maternal expulsive forces and the assistance of gentle upward traction  The remainder of the fetus delivered spontaneously  Upon delivery, the infant was placed on the mothers abdomen and the cord was clamped and cut  Delayed cord clamping was performed  The infant was noted to cry spontaneously and was moving all extremities appropriately  There was no evidence for injury  Awaiting nurse resuscitators evaluated the   Arterial and venous cord blood gases and cord blood was collected for analysis  These were promptly sent to the lab  In the immediate post-partum, 30 units of IV pitocin was administered, and the uterus was noted to contract down well with massage and pitocin  Cord elongation was noted however the placenta was not noted to deliver spontaneously  The pitocin infusion was held and a manual extraction of the placenta was performed  She received 2 grams of ancef of prophylaxis  The pitocin infusion was then resumed and bleeding was noted to improve  The vagina, cervix, perineum, and rectum were then inspected and there was noted to be a 2nd degree laceration which was repaired with 3-0 vicryl rapide   Under adequate anesthesia, the apex of the vaginal laceration was identified and an anchoring suture was placed 1 cm above the apex  The vaginal mucosa and underlying rectovaginal fascia were closed using a running locked suture to the hymenal ring  The suture was then brought underneath the hymenal ring  Continuing with the same suture, the transverse perineal muscles were reapproximated with 3 transverse running sutures  The suture was brought to the posterior apex of the skin laceration and then the skin was reapproximated in a subcuticular fashion to the hymenal ring  Excellent hemostasis was achieved  At the conclusion of the procedure, all needle, sponge, and instrument counts were noted to be correct  Patient tolerated the procedure well and was allowed to recover in labor and delivery room with family and  before being transferred to the post-partum floor  Dr Reji Sherman was present and participated in all key portions of the case          Lianna Sood MD  2022  8:17 PM

## 2022-01-18 NOTE — DISCHARGE SUMMARY
Discharge Summary - Paris Rivera 36 y o  female MRN: [id number]    Unit/Bed#: -01 Encounter: [id number]    Admission Date: 2022     Discharge Date: 22    Admitting Attending: Angel Lemus  Delivering Attendin [facility]  Discharge Attending: Ijeoma    Diagnosis:   1  Pregnancy at 39 weeks of gestation   2  Herpes   3  PCOS  4  Asthma   5  Gestational Hypertension     Procedures: Spontaneous Vaginal Delivery    Complications: none apparent    Hospital Course: Ms Paris Rivera is a 36 y o   at 36wk3d  She presented to labor and delivery for elective induction of labor  On arrival she was found to be 2-3/60/-2  A claire balloon was placed and she received a dose of cytotec  She made change to 80/-1 and a low dose pitocin titration was started  She received an epidural for analgesia  She was AROM'd for clear fluid  She progressed to complete and started pushing  Throughout her labor course she met criteria for gestational hypertension  She delivered via  with a second degree laceration  She had an uncomplicated postpartum course  Condition at discharge: good     On day of discharge, patient was tolerating PO, passing flatus, urinating, and ambulating  Her pain was well controlled with oral analgesics  She was discharged home on postpartum day #2 with standard post partum instructions to follow up with her physician in 3-6 weeks for a postpartum appointment  Discharge instructions/Information to patient and family:   - Do not place anything (no partner, tampons or douche) in your vagina for 6 weeks    -You may walk for exercise for the first 6 weeks then gradually return to your usual activities    -Please do not drive for 1 week if you have no stitches and for 2 weeks if you have stitches or underwent a  delivery     -You may take baths or shower per your preference    -Please look at your bust (breasts) in the mirror daily and call for redness or tenderness or increased warmth    -Please call us for temperature > 100 4*F or 38* C, worsening pain or a foul discharge  Discharge Medications:   Prenatal vitamin daily for 6 months or the duration of nursing whichever is longer  Motrin 600 mg orally every 6 hours as needed for pain  Tylenol (over the counter) per bottle directions as needed for pain: do NOT use with percocet  Hydrocortisone cream 1% (over the counter) applied 1-2x daily to hemorrhoids as needed    Provisions for Follow-Up Care: Follow up with your doctor in 4 weeks for postpartum care       Planned Readmission: Judi Nielsen MD

## 2022-01-18 NOTE — ANESTHESIA POSTPROCEDURE EVALUATION
Post-Op Assessment Note    CV Status:  Stable    Pain management: adequate     Mental Status:  Alert and awake   Hydration Status:  Euvolemic   PONV Controlled:  Controlled   Airway Patency:  Patent and adequate      Post Op Vitals Reviewed: Yes      Staff: CRNA     Post-op block assessment: site cleaned, catheter intact and no complications      No complications documented      BP      Temp      Pulse     Resp      SpO2

## 2022-01-18 NOTE — UTILIZATION REVIEW
Inpatient Admission Authorization Request   Notification of Maternity/Delivery &  Birth Information for Admission   SERVICING FACILITY:   [address] [facility] [address] [place] [address]  Tax ID: [id number]  NPI: [id number]  Place of Service: [facility]patient [facility]  [address]  Place of Service Code: 24     ATTENDING PROVIDER:  Attending Name and NPI#: Miguel Casas Md [[id number]]  Address: [facility], [address] [place] [address]  Phone: [phone]     UTILIZATION REVIEW CONTACT:  Liliana Angulo Utilization Review Supervisor  Network Utilization Review Department  Phone: [phone]  Fax [phone]  Email: [email or web address]     PHYSICIAN ADVISORY SERVICES:  FOR [id number]-XCDJ REVIEW - MEDICAL NECESSITY DENIAL  Phone: [phone]  Fax: [phone]  Email: [email or web address]     TYPE OF REQUEST:  Inpatient Status     ADMISSION INFORMATION:  ADMISSION DATE/TIME: 22  7:56 PM  PATIENT DIAGNOSIS CODE/DESCRIPTION:  39 weeks gestation of pregnancy [Z3A 39]  Encounter for full-term uncomplicated delivery [X17] The encounter diagnosis was 39 weeks gestation of pregnancy  1  39 weeks gestation of pregnancy      DISCHARGE DATE/TIME: No discharge date for patient encounter  DISCHARGE DISPOSITION (IF DISCHARGED): Home/Self Care     MOTHER AND  INFORMATION:  Mother: Orlando Weeks 1990   Delivering clinician:    OB History        1    Para   1    Term   1            AB        Living   1       SAB        IAB        Ectopic        Multiple   0    Live Births   1               [place] Name & MRN:   Information for the patient's :  Henrietta Anderson) [[id number]]     [place] Delivery Information:  Sex: male  Delivered 2022 7:18 PM by Vaginal, Spontaneous; Gestational Age: 44w2d    [place] Measurements:  Weight: 8 lb 15 2 oz (4060 g);   Height: 20"    APGAR 1 minute 5 minutes 10 minutes Totals: 8 9       Birth Information: 36 y o  female MRN: [id number] Unit/Bed#: -01 Estimated Date of Delivery: 22  Birthweight: No birth weight on file  Gestational Age: <None> Delivery Type: Vaginal, Spontaneous          APGARS  One minute Five minutes Ten minutes   Totals:               IMPORTANT INFORMATION:  Please contact the [facility] directly with any questions or concerns regarding this request  Department voicemails are confidential     Send requests for admission clinical reviews, concurrent reviews, approvals, and administrative denials due to lack of clinical to fax [phone]

## 2022-01-18 NOTE — PROGRESS NOTES
Progress Note - OB/GYN   Cuca Grace 36 y o  female MRN: [id number]  Unit/Bed#: -01 Encounter: [id number]    Assessment:  Post partum Day #1 s/p , stable, baby in room    Plan:  1) Manual extraction of placenta   S/p 2g Ancef  2) HSV   On valtrex  3) gHTN   BP: 120-140/60-80   CBC/CMP WNL  3) Continue routine post partum care   Encourage ambulation   Encourage breastfeeding   Anticipate discharge PPD#2     Subjective/Objective   Chief Complaint:     Post delivery  Patient is doing well  Lochia WNL  Pain well controlled  Subjective:     Pain: yes, cramping, improved with meds  Tolerating PO: yes  Voiding: yes  Flatus: yes  Ambulating: yes  Chest pain: no  Shortness of breath: no  Leg pain: no  Lochia: minimal    Objective:     Vitals: /71   Pulse 77   Temp 97 7 °F (36 5 °C) (Oral)   Resp 16   Ht 5' 5" (1 651 m)   Wt 109 kg (240 lb)   SpO2 99%   Breastfeeding Yes   BMI 39 94 kg/m²     I/O        0701   0700  0701   0700    I V  (mL/kg) 970 (8 9) 3000 (27 5)    Total Intake(mL/kg) 970 (8 9) 3000 (27 5)    Urine (mL/kg/hr)  3975 (1 5)    Blood  222    Total Output  4197    Net +970 -1197                Lab Results   Component Value Date    WBC 11 71 (H) 2022    HGB 10 7 (L) 2022    HCT 32 5 (L) 2022    MCV 92 2022     2022       Physical Exam:     Gen: AAOx3, NAD  CV: RRR  Lungs: CTA b/l  Abd: Soft, non-tender, non-distended, no rebound or guarding  Uterine fundus firm and non-tender, 1 cm below the umbilicus    Ext: Non tender    Rozanne Spatz, MD  2022  6:51 AM

## 2022-01-19 VITALS
OXYGEN SATURATION: 98 % | DIASTOLIC BLOOD PRESSURE: 74 MMHG | WEIGHT: 240 LBS | TEMPERATURE: 98.2 F | HEART RATE: 88 BPM | SYSTOLIC BLOOD PRESSURE: 120 MMHG | HEIGHT: 65 IN | BODY MASS INDEX: 39.99 KG/M2 | RESPIRATION RATE: 20 BRPM

## 2022-01-19 PROCEDURE — 99024 POSTOP FOLLOW-UP VISIT: CPT | Performed by: STUDENT IN AN ORGANIZED HEALTH CARE EDUCATION/TRAINING PROGRAM

## 2022-01-19 RX ORDER — CALCIUM CARBONATE 200(500)MG
1000 TABLET,CHEWABLE ORAL DAILY PRN
Refills: 0
Start: 2022-01-19 | End: 2022-02-17

## 2022-01-19 RX ORDER — ACETAMINOPHEN 325 MG/1
650 TABLET ORAL EVERY 4 HOURS PRN
Qty: 30 TABLET | Refills: 0 | Status: SHIPPED | OUTPATIENT
Start: 2022-01-19 | End: 2022-02-17

## 2022-01-19 RX ORDER — SIMETHICONE 80 MG
80 TABLET,CHEWABLE ORAL 4 TIMES DAILY PRN
Qty: 30 TABLET | Refills: 0
Start: 2022-01-19 | End: 2022-02-17

## 2022-01-19 RX ORDER — DIAPER,BRIEF,INFANT-TODD,DISP
1 EACH MISCELLANEOUS DAILY PRN
Qty: 30 G | Refills: 0
Start: 2022-01-19 | End: 2022-02-17

## 2022-01-19 RX ORDER — DIPHENHYDRAMINE HCL 25 MG
25 TABLET ORAL EVERY 6 HOURS PRN
Qty: 30 TABLET | Refills: 0 | Status: CANCELLED
Start: 2022-01-19

## 2022-01-19 RX ORDER — IBUPROFEN 600 MG/1
600 TABLET ORAL EVERY 6 HOURS PRN
Qty: 30 TABLET | Refills: 0 | Status: SHIPPED | OUTPATIENT
Start: 2022-01-19 | End: 2022-02-17

## 2022-01-19 RX ORDER — DOCUSATE SODIUM 100 MG/1
100 CAPSULE, LIQUID FILLED ORAL 2 TIMES DAILY PRN
Refills: 0
Start: 2022-01-19

## 2022-01-19 RX ADMIN — IBUPROFEN 600 MG: 600 TABLET ORAL at 04:11

## 2022-01-19 RX ADMIN — DOCUSATE SODIUM 100 MG: 100 CAPSULE ORAL at 10:13

## 2022-01-19 RX ADMIN — IBUPROFEN 600 MG: 600 TABLET ORAL at 10:14

## 2022-01-19 NOTE — PROGRESS NOTES
Progress Note - OB/GYN   Raissa Castillo 36 y o  female MRN: [id number]  Unit/Bed#:  319-01 Encounter: [id number]    Assessment:  Post partum Day #2 s/p , stable, baby in nursery    Plan:  1) Manual extraction of placenta              S/p 2g Ancef  2) HSV              On valtrex  3) gHTN              BP: 120-140/60-80              CBC/CMP WNL  3) Continue routine post partum care              Encourage ambulation              Encourage breastfeeding              Anticipate discharge PPD#2     Subjective/Objective   Chief Complaint:     Post delivery  Patient is doing well  Lochia WNL  Pain well controlled  Subjective:     Pain: yes, cramping, improved with meds  Tolerating PO: yes  Voiding: yes  Flatus: yes  Ambulating: yes  Chest pain: no  Shortness of breath: no  Leg pain: no  Lochia: minimal    Objective:     Vitals: /62 (BP Location: Right arm)   Pulse 70   Temp 97 8 °F (36 6 °C) (Oral)   Resp 18   Ht 5' 5" (1 651 m)   Wt 109 kg (240 lb)   SpO2 98%   Breastfeeding Yes   BMI 39 94 kg/m²     I/O        0701   0700  0701   0700    I V  (mL/kg) 3000 (27 5)     Total Intake(mL/kg) 3000 (27 5)     Urine (mL/kg/hr) 3975 (1 5)     Blood 222     Total Output 4197     Net -1197                 Lab Results   Component Value Date    WBC 11 71 (H) 2022    HGB 10 7 (L) 2022    HCT 32 5 (L) 2022    MCV 92 2022     2022       Physical Exam:     Gen: AAOx3, NAD  CV: RRR  Lungs: CTA b/l  Abd: Soft, non-tender, non-distended, no rebound or guarding  Uterine fundus firm and non-tender, 1 cm below the umbilicus    Ext: Non tender    Kristie Madrigal MD  2022  5:53 AM

## 2022-01-19 NOTE — PLAN OF CARE
Problem: PAIN - ADULT  Goal: Verbalizes/displays adequate comfort level or baseline comfort level  Description: Interventions:  - Encourage patient to monitor pain and request assistance  - Assess pain using appropriate pain scale  - Administer analgesics based on type and severity of pain and evaluate response  - Implement non-pharmacological measures as appropriate and evaluate response  - Consider cultural and social influences on pain and pain management  - Notify physician/advanced practitioner if interventions unsuccessful or patient reports new pain  1/19/2022 0241 by Antwan Villagomez RN  Outcome: Progressing  1/19/2022 0241 by Antwan Villagomez RN  Outcome: Progressing     Problem: INFECTION - ADULT  Goal: Absence or prevention of progression during hospitalization  Description: INTERVENTIONS:  - Assess and monitor for signs and symptoms of infection  - Monitor lab/diagnostic results  - Monitor all insertion sites, i e  indwelling lines, tubes, and drains  - Monitor endotracheal if appropriate and nasal secretions for changes in amount and color  - Riggins appropriate cooling/warming therapies per order  - Administer medications as ordered  - Instruct and encourage patient and family to use good hand hygiene technique  - Identify and instruct in appropriate isolation precautions for identified infection/condition  1/19/2022 0241 by Antwan Villagomez RN  Outcome: Progressing  1/19/2022 0241 by Antwan Villagomez RN  Outcome: Progressing  Goal: Absence of fever/infection during neutropenic period  Description: INTERVENTIONS:  - Monitor WBC    1/19/2022 0241 by Antwan Villagomez RN  Outcome: Progressing  1/19/2022 0241 by Antwan Villagomez RN  Outcome: Progressing     Problem: SAFETY ADULT  Goal: Patient will remain free of falls  Description: INTERVENTIONS:  - Educate patient/family on patient safety including physical limitations  - Instruct patient to call for assistance with activity   - Consult OT/PT to assist with strengthening/mobility   - Keep Call bell within reach  - Keep bed low and locked with side rails adjusted as appropriate  - Keep care items and personal belongings within reach  - Initiate and maintain comfort rounds  - Make Fall Risk Sign visible to staff  - Offer Toileting every  Hours, in advance of need  - Initiate/Maintain alarm  - Obtain necessary fall risk management equipment:   - Apply yellow socks and bracelet for high fall risk patients  - Consider moving patient to room near nurses station  1/19/2022 0241 by Shanae Fong RN  Outcome: Progressing  1/19/2022 0241 by Shanae Fong RN  Outcome: Progressing  Goal: Maintain or return to baseline ADL function  Description: INTERVENTIONS:  -  Assess patient's ability to carry out ADLs; assess patient's baseline for ADL function and identify physical deficits which impact ability to perform ADLs (bathing, care of mouth/teeth, toileting, grooming, dressing, etc )  - Assess/evaluate cause of self-care deficits   - Assess range of motion  - Assess patient's mobility; develop plan if impaired  - Assess patient's need for assistive devices and provide as appropriate  - Encourage maximum independence but intervene and supervise when necessary  - Involve family in performance of ADLs  - Assess for home care needs following discharge   - Consider OT consult to assist with ADL evaluation and planning for discharge  - Provide patient education as appropriate  1/19/2022 0241 by Shanae Fong RN  Outcome: Progressing  1/19/2022 0241 by Shanae Fong RN  Outcome: Progressing  Goal: Maintains/Returns to pre admission functional level  Description: INTERVENTIONS:  - Perform BMAT or MOVE assessment daily    - Set and communicate daily mobility goal to care team and patient/family/caregiver     - Collaborate with rehabilitation services on mobility goals if consulted  - Perform Range of Motion  times a day  - Reposition patient every  hours    - Dangle patient  times a day  - Stand patient  times a day  - Ambulate patient  times a day  - Out of bed to chair  times a day   - Out of bed for meal times a day  - Out of bed for toileting  - Record patient progress and toleration of activity level   2022 by Yaritza Cardoso RN  Outcome: Progressing  2022 by Yaritza Cardoso RN  Outcome: Progressing     Problem: DISCHARGE PLANNING  Goal: Discharge to home or other facility with appropriate resources  Description: INTERVENTIONS:  - Identify barriers to discharge w/patient and caregiver  - Arrange for needed discharge resources and transportation as appropriate  - Identify discharge learning needs (meds, wound care, etc )  - Arrange for interpretive services to assist at discharge as needed  - Refer to Case Management Department for coordinating discharge planning if the patient needs post-hospital services based on physician/advanced practitioner order or complex needs related to functional status, cognitive ability, or social support system  2022 by Yaritza Cardoso RN  Outcome: Progressing  2022 by Yaritza Cardoso RN  Outcome: Progressing     Problem: POSTPARTUM  Goal: Experiences normal postpartum course  Description: INTERVENTIONS:  - Monitor maternal vital signs  - Assess uterine involution and lochia  2022 by Yaritza Cardoso RN  Outcome: Progressing  2022 by Yaritza Cardoso RN  Outcome: Progressing  Goal: Appropriate maternal -  bonding  Description: INTERVENTIONS:  - Identify family support  - Assess for appropriate maternal/infant bonding   -Encourage maternal/infant bonding opportunities  - Referral to  or  as needed  2022 by Yaritza Cardoso RN  Outcome: Progressing  2022 by Yaritza Cardoso RN  Outcome: Progressing  Goal: Establishment of infant feeding pattern  Description: INTERVENTIONS:  - Assess breast/bottle feeding  - Refer to lactation as needed  1/19/2022 0241 by Tilton Snellen, RN  Outcome: Progressing  1/19/2022 0241 by Tilton Snellen, RN  Outcome: Progressing  Goal: Incision(s), wounds(s) or drain site(s) healing without S/S of infection  Description: INTERVENTIONS  - Assess and document dressing, incision, wound bed, drain sites and surrounding tissue  - Provide patient and family education  - Perform skin care/dressing changes every   1/19/2022 0241 by Tilton Snellen, RN  Outcome: Progressing  1/19/2022 0241 by Tilton Snellen, RN  Outcome: Progressing

## 2022-01-19 NOTE — LACTATION NOTE
This note was copied from a baby's chart  Mom was previously provided with DC booklet and teaching, no further concerns in regards to that at this time  States baby is latching well to the L breast but not to the R  Enc her to call when baby shows hunger cues again  Parents express concerns regarding IVF conception and PCOS, Reviewed output, bili, and % weight loss with Jose Adler to reassure her baby is feeding well so far  Enc her to contact [address] [facility] for supply or any other breastfeeding concerns after DC

## 2022-01-24 ENCOUNTER — TELEPHONE (OUTPATIENT)
Dept: OBGYN CLINIC | Facility: CLINIC | Age: 32
End: 2022-01-24

## 2022-01-24 LAB — PLACENTA IN STORAGE: NORMAL

## 2022-01-24 NOTE — TELEPHONE ENCOUNTER
----- Message from Kely Ramirez sent at 1/24/2022  9:52 AM EST -----  Regarding: Hypertension possibly   Hello, just wanted to reach out about my blood pressure I had last night and the swelling Ive had all week and make sure everythings okay or if there anything I can do

## 2022-01-24 NOTE — TELEPHONE ENCOUNTER
Pt denied sob, chest pain or any unrelenting headache  Pt has not checked bp today  Last night her bp was 142/96  Griselda Galan pt denied one leg being more swollen than the other or tender to touch  pt informed as directed  Pt agreed to plan of action

## 2022-01-24 NOTE — TELEPHONE ENCOUNTER
If any shortness of breath, chest pain, unrelenting headache, for evaluation  If blood pressure top number >160 or bottom number >110, to call back  If one leg is more swollen than the other and tender to touch, for evaluation  Otherwise recommend ambulation and compression stockings  Swelling tends to worsen prior to improving postpartum

## 2022-02-02 NOTE — UTILIZATION REVIEW
Mother and Baby Discharged      Discharge Summary - Orlando Weeks 36 y o  female MRN: [id number]     Unit/Bed#: -01 Encounter: [id number]     Admission Date: 2022      Discharge Date: 22     Admitting Attending: Bernardo Jeronimo  Delivering Attendin [facility]  Discharge Attending: Bernardo Jeronimo     Diagnosis:   1  Pregnancy at 39 weeks of gestation   2  Herpes   3  PCOS  4  Asthma   5  Gestational Hypertension      Procedures: Spontaneous Vaginal Delivery     Complications: none apparent     Hospital Course: Ms Abiola Ramirez is a 31 y o   at 39wk2d  She presented to labor and delivery for elective induction of labor  On arrival she was found to be 2-3/60/-2  A claire balloon was placed and she received a dose of cytotec  She made change to 5/80/-1 and a low dose pitocin titration was started  She received an epidural for analgesia  She was AROM'd for clear fluid  She progressed to complete and started pushing  Throughout her labor course she met criteria for gestational hypertension  She delivered via  with a second degree laceration  She had an uncomplicated postpartum course       Condition at discharge: good      On day of discharge, patient was tolerating PO, passing flatus, urinating, and ambulating  Her pain was well controlled with oral analgesics  She was discharged home on postpartum day #2 with standard post partum instructions to follow up with her physician in 3-6 weeks for a postpartum appointment       Discharge instructions/Information to patient and family:   - Do not place anything (no partner, tampons or douche) in your vagina for 6 weeks    -You may walk for exercise for the first 6 weeks then gradually return to your usual activities    -Please do not drive for 1 week if you have no stitches and for 2 weeks if you have stitches or underwent a  delivery     -You may take baths or shower per your preference    -Please look at your bust (breasts) in the mirror daily and call for redness or tenderness or increased warmth    -Please call us for temperature > 100 4*F or 38* C, worsening pain or a foul discharge       Discharge Medications:   Prenatal vitamin daily for 6 months or the duration of nursing whichever is longer  Motrin 600 mg orally every 6 hours as needed for pain  Tylenol (over the counter) per bottle directions as needed for pain: do NOT use with percocet  Hydrocortisone cream 1% (over the counter) applied 1-2x daily to hemorrhoids as needed     Provisions for Follow-Up Care:   Follow up with your doctor in 4 weeks for postpartum care       Planned Readmission: No     Leonor Morse MD                        Cosigned by: Subha Zamora MD at 1/19/2022  9:50 AM       Revision History

## 2022-02-17 ENCOUNTER — POSTPARTUM VISIT (OUTPATIENT)
Dept: OBGYN CLINIC | Age: 32
End: 2022-02-17

## 2022-02-17 VITALS — BODY MASS INDEX: 35.51 KG/M2 | DIASTOLIC BLOOD PRESSURE: 70 MMHG | WEIGHT: 213.4 LBS | SYSTOLIC BLOOD PRESSURE: 118 MMHG

## 2022-02-17 DIAGNOSIS — Z30.015 ENCOUNTER FOR INITIAL PRESCRIPTION OF VAGINAL RING HORMONAL CONTRACEPTIVE: ICD-10-CM

## 2022-02-17 PROBLEM — O99.210 OBESITY AFFECTING PREGNANCY, ANTEPARTUM: Status: RESOLVED | Noted: 2021-12-03 | Resolved: 2022-02-17

## 2022-02-17 PROBLEM — Z34.03 FIRST PREGNANCY, THIRD TRIMESTER: Status: RESOLVED | Noted: 2021-07-15 | Resolved: 2022-02-17

## 2022-02-17 PROCEDURE — 99024 POSTOP FOLLOW-UP VISIT: CPT | Performed by: STUDENT IN AN ORGANIZED HEALTH CARE EDUCATION/TRAINING PROGRAM

## 2022-02-17 RX ORDER — ETONOGESTREL AND ETHINYL ESTRADIOL 11.7; 2.7 MG/1; MG/1
INSERT, EXTENDED RELEASE VAGINAL
Qty: 3 EACH | Refills: 3 | Status: SHIPPED | OUTPATIENT
Start: 2022-03-06 | End: 2022-05-16 | Stop reason: SDUPTHER

## 2022-02-17 NOTE — PROGRESS NOTES
Assessment and Plan:  Postpartum visit  Doing well  Problem List Items Addressed This Visit        Other     (spontaneous vaginal delivery) - Primary      Other Visit Diagnoses     Encounter for initial prescription of vaginal ring hormonal contraceptive        Relevant Medications    etonogestrel-ethinyl estradiol (NuvaRing) 0 12-0 015 MG/24HR vaginal ring (Start on 3/6/2022)        Pap smear due  - reports normal   Postpartum depression: low risk  Contraception: plan for NuvaRing    No history of deep vein thrombosis, pulmonary embolism, stroke, hypertension, smoking, or migraines with aura  Reviewed slightly increased risk of the above with estrogen-containing medication  No history breast cancer, endometrial cancer  Reviewed warning signs, side effects, and reasons to call  Discussed importance of barrier contraception with each sexual encounter for STI prevention and back up  Plan to start after 6 weeks postpartum  Reviewed potential effect on lactation    She will return in 3-4 months for her yearly exam, sooner PRN    --------------------------------------------------  No chief complaint on file  Subjective:   36 y o  female here for postpartum visit  She is s/p Uncomplicated vaginal delivery on 2022   Antepartum complicated by history HSV, PCOS, asthma, gestational hypertension  Labor course uncomplicated  Postpartum course uncomplicated  Concerns today: none, feeling good! Would like to restart NuvaRing for contraception     Gender: male, named Thalia Median  Apgars: 8/9  Weight: 8lb 15 2oz  Her lochia has decreased  She is Breastfeeding without problems  She denies postpartum blues/depression  [place]  Depression Scale Total: 2  Last Pap: Not on file, per patient  normal, no history abnormal    We discussed contraceptive options in detail including birth control pills, patches, NuvaRing, DepoProvera, Mirena/Kyleena IUD, Nexplanon in detail    At this point she is interested in NuvaRing  Objective:  /70   Wt 96 8 kg (213 lb 6 4 oz)   BMI 35 51 kg/m²   General:  NAD AAOx3  HEENT:  moist mucous membranes  Chest:  non-labored breathing  Breasts: no redness, erythema, or tenderness    Abdomen:  soft, non-distended,      Speculum exam: Normal appearing external genitalia, normal hair distribution  Urethra well-suspended, no clitoromegaly noted  Vagina pink, moist, well-rugated  scant lochia noted  Cervix without lesion  Pelvic exam: No cervical motion tenderness  No adnexal masses or tenderness  Anteverted uterus, normal size   Well-healing 2nd degree laceration repair    Extremity: warm and well-perfused, calves non-tender to palpation   Neuro: grossly normal  Skin: no rash noted

## 2022-02-17 NOTE — PROGRESS NOTES
Patient presents for a postpartum visit  Patient is 31 days postpartum  Delivered on  to a baby 8lbs 15 2 OZ  Via Vag/Spont/epidural   Patient is pumping and bottle feeding and is supplementing with formula  Baby is doing well! Not currently sexually active  Patient is interested in birth control options  Would like nuvaring  Bowel movements are normal  Urine is normal  Bleeding is light/spotting         PPD=2

## 2022-02-28 ENCOUNTER — TELEPHONE (OUTPATIENT)
Dept: OBGYN CLINIC | Facility: CLINIC | Age: 32
End: 2022-02-28

## 2022-02-28 ENCOUNTER — PATIENT MESSAGE (OUTPATIENT)
Dept: OBGYN CLINIC | Age: 32
End: 2022-02-28

## 2022-02-28 NOTE — TELEPHONE ENCOUNTER
That's definitely a tough personal decision - either is reasonable and we'll support however we can! If would like to talk to lactation can do that as well

## 2022-02-28 NOTE — TELEPHONE ENCOUNTER
----- Message from Divina Randle sent at 2/28/2022 11:35 AM EST -----  Regarding: Birth control   Hello, I was reaching out because I wanted to go back on birth control at 6 weeks postpartum and I had asked for the NuvaRing because with my PCOS and amenorrhea it was the only thing that ever made me feel normal and get a normal cycle because I dont get one at all without some type of estrogen  Im just concerned about my breast milk and wanted to see if you had any input on whether or not I should choose it over the nuvaRing  I think Im just struggling with the mom guilt choosing birth control over my milk supply even though Ive always been up to using formula exclusively and supplement with it now so was looking for a professional opinion on what steps I should take      Thank you in advance

## 2022-03-10 ENCOUNTER — TELEPHONE (OUTPATIENT)
Dept: OBGYN CLINIC | Facility: CLINIC | Age: 32
End: 2022-03-10

## 2022-03-10 NOTE — TELEPHONE ENCOUNTER
I called and spoke with pt- pt said no chills or fever to her knowledge, just a bad HA  Was taking tylenol, ran out and has to get more  I told pt for now, just do as recc and monitor  Pt is aware to call us back if pain worsens/gets fever or chills

## 2022-03-10 NOTE — TELEPHONE ENCOUNTER
----- Message from Bennie Ceja sent at 3/10/2022 11:45 AM EST -----  Regarding: Mastitis   Hello, I think I have mastitis but Im away on vacation in [place] and unsure as to what I should do  Currently doing hot compresses, massaging and Tylenol

## 2022-05-16 ENCOUNTER — ANNUAL EXAM (OUTPATIENT)
Dept: OBGYN CLINIC | Age: 32
End: 2022-05-16
Payer: COMMERCIAL

## 2022-05-16 VITALS
SYSTOLIC BLOOD PRESSURE: 126 MMHG | BODY MASS INDEX: 34.49 KG/M2 | DIASTOLIC BLOOD PRESSURE: 82 MMHG | HEIGHT: 65 IN | WEIGHT: 207 LBS

## 2022-05-16 DIAGNOSIS — Z30.015 ENCOUNTER FOR INITIAL PRESCRIPTION OF VAGINAL RING HORMONAL CONTRACEPTIVE: ICD-10-CM

## 2022-05-16 DIAGNOSIS — Z01.419 ENCOUNTER FOR GYNECOLOGICAL EXAMINATION (GENERAL) (ROUTINE) WITHOUT ABNORMAL FINDINGS: Primary | ICD-10-CM

## 2022-05-16 PROCEDURE — G0476 HPV COMBO ASSAY CA SCREEN: HCPCS | Performed by: STUDENT IN AN ORGANIZED HEALTH CARE EDUCATION/TRAINING PROGRAM

## 2022-05-16 PROCEDURE — G0145 SCR C/V CYTO,THINLAYER,RESCR: HCPCS | Performed by: STUDENT IN AN ORGANIZED HEALTH CARE EDUCATION/TRAINING PROGRAM

## 2022-05-16 PROCEDURE — 99395 PREV VISIT EST AGE 18-39: CPT | Performed by: STUDENT IN AN ORGANIZED HEALTH CARE EDUCATION/TRAINING PROGRAM

## 2022-05-16 RX ORDER — ETONOGESTREL AND ETHINYL ESTRADIOL 11.7; 2.7 MG/1; MG/1
INSERT, EXTENDED RELEASE VAGINAL
Qty: 9 EACH | Refills: 4 | Status: SHIPPED | OUTPATIENT
Start: 2022-05-16

## 2022-05-16 NOTE — PROGRESS NOTES
Kindra Hudson  1990      CC:  Yearly exam    S:  36 y o  female here for yearly exam  Her cycles have just returned after delivery/breastfeeding    LMP: 22  Contraception: Blima Irons  Last Pap: 2019 NILM    -hx abnormal at ~age 17, not sure what was abnormal     No alcohol, drugs, smoking  Exercises irregularly    Sexual activity: She is sexually active without pain, bleeding or dryness  STD testing:  She does not want STD testing today       Family hx of breast/ovarian/colon cancer: denies      Current Outpatient Medications:     etonogestrel-ethinyl estradiol (NuvaRing) 0 12-0 015 MG/24HR vaginal ring, Insert vaginally and leave in place for 3 consecutive weeks, then remove for 1 week , Disp: 9 each, Rfl: 4    Fexofenadine HCl (ALLEGRA PO), Take by mouth as needed, Disp: , Rfl:     Prenatal MV & Min w/FA-DHA (ONE A DAY PRENATAL PO), Take 1 tablet by mouth, Disp: , Rfl:     Probiotic Product (PROBIOTIC DAILY PO), Take by mouth, Disp: , Rfl:     docusate sodium (COLACE) 100 mg capsule, Take 1 capsule (100 mg total) by mouth 2 (two) times a day as needed for constipation (Patient not taking: Reported on 2022), Disp: , Rfl: 0  Social History     Socioeconomic History    Marital status: /Civil Union     Spouse name: Not on file    Number of children: Not on file    Years of education: Not on file    Highest education level: Not on file   Occupational History    Not on file   Tobacco Use    Smoking status: Former Smoker     Years: 1 00    Smokeless tobacco: Never Used    Tobacco comment: Socially, stopped 5 yeas ago   Vaping Use    Vaping Use: Never used   Substance and Sexual Activity    Alcohol use: Yes     Comment: socially    Drug use: Never    Sexual activity: Yes     Partners: Male   Other Topics Concern    Not on file   Social History Narrative    Not on file     Social Determinants of Health     Financial Resource Strain: Not on file   Food Insecurity: Not on file   Transportation Needs: Not on file   Physical Activity: Not on file   Stress: Not on file   Social Connections: Not on file   Intimate Partner Violence: Not on file   Housing Stability: Not on file     Family History   Problem Relation Age of Onset    No Known Problems Mother     No Known Problems Father     No Known Problems Brother     Cancer Maternal Grandmother         Optical    Diabetes Maternal Grandfather     Heart attack Maternal Grandfather     Alzheimer's disease Paternal Grandmother       Past Medical History:   Diagnosis Date    Abnormal Pap smear of cervix     Asthma     albuterol as needed    Female infertility     Genital warts     Spouse has history    Herpes     last outbreak 1 year ago    Polycystic ovary syndrome         Review of Systems   Respiratory: Negative  Cardiovascular: Negative  Gastrointestinal: Negative for constipation and diarrhea  Genitourinary: Negative for difficulty urinating, pelvic pain, vaginal bleeding, vaginal discharge, itching or odor  O:  Blood pressure 126/82, height 5' 5" (1 651 m), weight 93 9 kg (207 lb), last menstrual period 04/28/2022, not currently breastfeeding  Patient appears well and is not in distress  Neck is supple without masses  Breasts are symmetrical without mass, tenderness, nipple discharge, skin changes or adenopathy  Abdomen is soft and nontender without masses  External genitals are normal without lesions or rashes  Urethral meatus and urethra are normal  Bladder is normal to palpation  Vagina is normal without discharge or bleeding  Cervix is normal without discharge or lesion  Uterus is normal, mobile, nontender without palpable mass  Adnexa are normal, nontender, without palpable mass  Rectum without abnormality    A:  Yearly exam      P:   Pap collected today   Happy with nuva ring - renewed      RTO one year for yearly exam or sooner as needed

## 2022-05-17 LAB
HPV HR 12 DNA CVX QL NAA+PROBE: NEGATIVE
HPV16 DNA CVX QL NAA+PROBE: NEGATIVE
HPV18 DNA CVX QL NAA+PROBE: NEGATIVE

## 2022-05-23 LAB
LAB AP GYN PRIMARY INTERPRETATION: NORMAL
Lab: NORMAL

## 2022-11-10 ENCOUNTER — TELEPHONE (OUTPATIENT)
Dept: OBGYN CLINIC | Facility: CLINIC | Age: 32
End: 2022-11-10

## 2022-11-10 DIAGNOSIS — B00.9 HERPES: Primary | ICD-10-CM

## 2022-11-10 NOTE — TELEPHONE ENCOUNTER
----- Message from Lj Escoto sent at 11/10/2022  9:44 AM EST -----  Regarding: Prescription refill   I usually only use during an outbreak! During the end of my pregnancy was the only time I had to use it daily

## 2022-11-11 RX ORDER — VALACYCLOVIR HYDROCHLORIDE 1 G/1
1000 TABLET, FILM COATED ORAL DAILY
Qty: 5 TABLET | Refills: 0 | Status: SHIPPED | OUTPATIENT
Start: 2022-11-11 | End: 2022-11-16

## 2022-12-31 NOTE — PROGRESS NOTES
Please refer to the [facility] ultrasound report in Ob Procedures for additional information regarding today's visit 
Ultrasound Probe Disinfection    A transvaginal ultrasound was performed  Prior to use, disinfection was performed with High Level Disinfection Process (Trophon)  Probe serial number M1: [id number] was used        Elin Polk  09/10/21  8:49 AM
NEGATIVE

## 2023-01-10 DIAGNOSIS — B00.9 HERPES: ICD-10-CM

## 2023-01-11 RX ORDER — VALACYCLOVIR HYDROCHLORIDE 1 G/1
1000 TABLET, FILM COATED ORAL DAILY
Qty: 5 TABLET | Refills: 1 | Status: SHIPPED | OUTPATIENT
Start: 2023-01-11 | End: 2023-01-16

## 2023-05-22 NOTE — PROGRESS NOTES
Braulio Burgos  1990    Assessment and Plan:  Yearly exam without abnormality  Problem List Items Addressed This Visit        Endocrine    PCOS (polycystic ovarian syndrome)   Other Visit Diagnoses     Encounter for gynecological examination (general) (routine) without abnormal findings    -  Primary    Encounter for initial prescription of vaginal ring hormonal contraceptive        Relevant Medications    etonogestrel-ethinyl estradiol (NuvaRing) 0 12-0 015 MG/24HR vaginal ring        -Pap up to date  We reviewed ASCCP guidelines for Pap testing today    -Happy with NuvaRing, refilled  Contemplating pregnancy in the next year, but with trepidation re  Cost of IVF    RTO one year for yearly exam or sooner as needed  CC:  Yearly exam    S:  29 y o  female here for yearly exam      Stoney Morrison  LMP 4/25/23  Contraception: Terrence Hodgkins   Last Pap: 5/2022 NILM/HPV-    Non-smoker, social drinker  Exercises irregularly    Doing ok overall  Her cycles are regular, not heavy or crampy  Sexual activity: She is sexually active without pain, bleeding or dryness  STD testing:  She does not want STD testing today  Family hx of breast cancer: denies  Family hx of ovarian cancer: denies  Family hx of colon cancer: denies     Denies hot flushes, dyspareunia, abnormal uterine bleeding, urinary/fecal incontinence, changes in energy levels, mood         Current Outpatient Medications:   •  amphetamine-dextroamphetamine (ADDERALL XR) 30 MG 24 hr capsule, Take 30 mg by mouth daily, Disp: , Rfl:   •  etonogestrel-ethinyl estradiol (NuvaRing) 0 12-0 015 MG/24HR vaginal ring, Insert vaginally and leave in place for 3 consecutive weeks, then remove for 1 week , Disp: 9 each, Rfl: 4  •  Prenatal MV & Min w/FA-DHA (ONE A DAY PRENATAL PO), Take 1 tablet by mouth, Disp: , Rfl:   •  Probiotic Product (PROBIOTIC DAILY PO), Take by mouth, Disp: , Rfl:   •  docusate sodium (COLACE) 100 mg capsule, Take 1 capsule (100 mg total) by mouth 2 (two) times a day as needed for constipation (Patient not taking: Reported on 5/16/2022), Disp: , Rfl: 0  •  Fexofenadine HCl (ALLEGRA PO), Take by mouth as needed (Patient not taking: Reported on 5/23/2023), Disp: , Rfl:   •  valACYclovir (VALTREX) 1,000 mg tablet, Take 1 tablet (1,000 mg total) by mouth daily for 5 days, Disp: 5 tablet, Rfl: 1  Social History     Socioeconomic History   • Marital status: /Civil Union     Spouse name: Not on file   • Number of children: Not on file   • Years of education: Not on file   • Highest education level: Not on file   Occupational History   • Not on file   Tobacco Use   • Smoking status: Former     Years: 1 00     Types: Cigarettes   • Smokeless tobacco: Never   • Tobacco comments:     Socially, stopped 5 yeas ago   Vaping Use   • Vaping Use: Never used   Substance and Sexual Activity   • Alcohol use: Yes     Comment: socially   • Drug use: Never   • Sexual activity: Yes     Partners: Male   Other Topics Concern   • Not on file   Social History Narrative   • Not on file     Social Determinants of Health     Financial Resource Strain: Not on file   Food Insecurity: Not on file   Transportation Needs: Not on file   Physical Activity: Not on file   Stress: Not on file   Social Connections: Not on file   Intimate Partner Violence: Not on file   Housing Stability: Not on file     Family History   Problem Relation Age of Onset   • No Known Problems Mother    • No Known Problems Father    • No Known Problems Brother    • Cancer Maternal Grandmother         Optical   • Diabetes Maternal Grandfather    • Heart attack Maternal Grandfather    • Alzheimer's disease Paternal Grandmother       Past Medical History:   Diagnosis Date   • Abnormal Pap smear of cervix    • Asthma     albuterol as needed   • Female infertility    • Genital warts     Spouse has history   • Herpes     last outbreak 1 year ago   • Polycystic ovary syndrome         Review of Systems Respiratory: Negative  Cardiovascular: Negative  Gastrointestinal: Negative for constipation and diarrhea  Genitourinary: Negative for difficulty urinating, pelvic pain, vaginal bleeding, vaginal discharge, itching or odor  O:  Blood pressure 122/74, weight 84 8 kg (187 lb), last menstrual period 04/25/2023, not currently breastfeeding  Patient appears well and is not in distress  Neck is supple without masses  Breasts are symmetrical without mass, tenderness, nipple discharge, skin changes or adenopathy  Abdomen is soft and nontender without masses  External genitals are normal without lesions or rashes  Urethral meatus and urethra are normal  Bladder is normal to palpation  Vagina is normal without discharge or bleeding  Cervix is normal without discharge or lesion  Uterus is normal, mobile, nontender without palpable mass  Adnexa are normal, nontender, without palpable mass

## 2023-05-23 ENCOUNTER — ANNUAL EXAM (OUTPATIENT)
Age: 33
End: 2023-05-23

## 2023-05-23 VITALS — SYSTOLIC BLOOD PRESSURE: 122 MMHG | DIASTOLIC BLOOD PRESSURE: 74 MMHG | BODY MASS INDEX: 31.12 KG/M2 | WEIGHT: 187 LBS

## 2023-05-23 DIAGNOSIS — Z30.015 ENCOUNTER FOR INITIAL PRESCRIPTION OF VAGINAL RING HORMONAL CONTRACEPTIVE: ICD-10-CM

## 2023-05-23 DIAGNOSIS — Z01.419 ENCOUNTER FOR GYNECOLOGICAL EXAMINATION (GENERAL) (ROUTINE) WITHOUT ABNORMAL FINDINGS: Primary | ICD-10-CM

## 2023-05-23 DIAGNOSIS — E28.2 PCOS (POLYCYSTIC OVARIAN SYNDROME): ICD-10-CM

## 2023-05-23 RX ORDER — ETONOGESTREL AND ETHINYL ESTRADIOL 11.7; 2.7 MG/1; MG/1
INSERT, EXTENDED RELEASE VAGINAL
Qty: 9 EACH | Refills: 4 | Status: SHIPPED | OUTPATIENT
Start: 2023-05-23

## 2023-05-23 RX ORDER — DEXTROAMPHETAMINE SACCHARATE, AMPHETAMINE ASPARTATE MONOHYDRATE, DEXTROAMPHETAMINE SULFATE AND AMPHETAMINE SULFATE 7.5; 7.5; 7.5; 7.5 MG/1; MG/1; MG/1; MG/1
30 CAPSULE, EXTENDED RELEASE ORAL DAILY
COMMUNITY
Start: 2023-05-19

## 2023-07-25 DIAGNOSIS — B00.9 HERPES: ICD-10-CM

## 2023-07-26 RX ORDER — VALACYCLOVIR HYDROCHLORIDE 1 G/1
1000 TABLET, FILM COATED ORAL DAILY
Qty: 5 TABLET | Refills: 1 | OUTPATIENT
Start: 2023-07-26 | End: 2023-07-31

## 2023-07-26 RX ORDER — VALACYCLOVIR HYDROCHLORIDE 1 G/1
1000 TABLET, FILM COATED ORAL DAILY
Qty: 5 TABLET | Refills: 0 | Status: SHIPPED | OUTPATIENT
Start: 2023-07-26 | End: 2023-07-31

## 2023-09-12 DIAGNOSIS — B00.9 HERPES: ICD-10-CM

## 2023-09-12 RX ORDER — VALACYCLOVIR HYDROCHLORIDE 1 G/1
1000 TABLET, FILM COATED ORAL DAILY
Qty: 5 TABLET | Refills: 3 | Status: SHIPPED | OUTPATIENT
Start: 2023-09-12 | End: 2023-09-17

## 2023-09-18 ENCOUNTER — TELEPHONE (OUTPATIENT)
Dept: OBGYN CLINIC | Facility: CLINIC | Age: 33
End: 2023-09-18

## 2023-09-19 DIAGNOSIS — Z30.015 ENCOUNTER FOR INITIAL PRESCRIPTION OF VAGINAL RING HORMONAL CONTRACEPTIVE: ICD-10-CM

## 2023-09-19 RX ORDER — ETONOGESTREL AND ETHINYL ESTRADIOL 11.7; 2.7 MG/1; MG/1
INSERT, EXTENDED RELEASE VAGINAL
Qty: 9 EACH | Refills: 4 | Status: SHIPPED | OUTPATIENT
Start: 2023-09-19

## 2023-09-19 NOTE — TELEPHONE ENCOUNTER
----- Message from Kimi Latosha sent at 9/18/2023 11:02 PM EDT -----  Regarding: NuvaRing  Contact: [phone]  Hello, I had an issue with insurance and wasn’t able to get my NuvaRing. I’m able to pick it up in the morning but unsure of how to go about it now that I’ll be 2 days behind (I should’ve put it in on Sunday). If you could just let me know what I should do, thank you in advance!

## 2023-09-19 NOTE — TELEPHONE ENCOUNTER
----- Message from Sheldon Hernandez sent at 9/19/2023 10:42 AM EDT -----  Regarding: re  Contact: [phone]  Yes they said because now I need to have it delivered from [facility] but can pay for it through [facility] this time.  I just don’t know if I’m okay to start it now so late

## 2023-10-03 ENCOUNTER — TELEPHONE (OUTPATIENT)
Dept: OBGYN CLINIC | Facility: CLINIC | Age: 33
End: 2023-10-03

## 2023-10-03 NOTE — TELEPHONE ENCOUNTER
Patient is not due for her period yet but sometimes it doesn't come and she needs progesterone. If she needs that does she need an appt?

## 2023-10-06 ENCOUNTER — PATIENT MESSAGE (OUTPATIENT)
Age: 33
End: 2023-10-06

## 2023-11-30 ENCOUNTER — OFFICE VISIT (OUTPATIENT)
Age: 33
End: 2023-11-30
Payer: COMMERCIAL

## 2023-11-30 VITALS — HEIGHT: 65 IN | BODY MASS INDEX: 30.32 KG/M2 | WEIGHT: 182 LBS

## 2023-11-30 DIAGNOSIS — D22.9 MULTIPLE NEVI: ICD-10-CM

## 2023-11-30 DIAGNOSIS — D18.01 CHERRY ANGIOMA: ICD-10-CM

## 2023-11-30 DIAGNOSIS — Z13.89 SCREENING FOR SKIN CONDITION: Primary | ICD-10-CM

## 2023-11-30 PROCEDURE — 99202 OFFICE O/P NEW SF 15 MIN: CPT | Performed by: DERMATOLOGY

## 2023-11-30 NOTE — PATIENT INSTRUCTIONS
When outside we recommend using a wide brim hat, sunglasses, long sleeve and pants, sunscreen with SPF 18+ with reapplication every 2 hours, or SPF specific clothing     MELANOCYTIC NEVI ("Moles")    Melanocytic nevi ("moles") are tan or brown, raised or flat areas of the skin which have an increased number of melanocytes. Melanocytes are the cells in our body which make pigment and account for skin color. Some moles are present at birth (I.e., "congenital nevi"), while others come up later in life (i.e., "acquired nevi"). The sun can stimulate the body to make more moles. Sunburns are not the only thing that triggers more moles. Chronic sun exposure can do it too. Clinically distinguishing a healthy mole from melanoma may be difficult, even for experienced dermatologists. The "ABCDE's" of moles have been suggested as a means of helping to alert a person to a suspicious mole and the possible increased risk of melanoma. The suggestions for raising alert are as follows:    Asymmetry: Healthy moles tend to be symmetric, while melanomas are often asymmetric. Asymmetry means if you draw a line through the mole, the two halves do not match in color, size, shape, or surface texture. Asymmetry can be a result of rapid enlargement of a mole, the development of a raised area on a previously flat lesion, scaling, ulceration, bleeding or scabbing within the mole. Any mole that starts to demonstrate "asymmetry" should be examined promptly by a board certified dermatologist.     Border: Healthy moles tend to have discrete, even borders. The border of a melanoma often blends into the normal skin and does not sharply delineate the mole from normal skin. Any mole that starts to demonstrate "uneven borders" should be examined promptly by a board certified dermatologist.     Color: Healthy moles tend to be one color throughout.   Melanomas tend to be made up of different colors ranging from dark black, blue, white, or red.  Any mole that demonstrates a color change should be examined promptly by a board certified dermatologist.     Diameter: Healthy moles tend to be smaller than 0.6 cm in size; an exception are "congenital nevi" that can be larger. Melanomas tend to grow and can often be greater than 0.6 cm (1/4 of an inch, or the size of a pencil eraser). This is only a guideline, and many normal moles may be larger than 0.6 cm without being unhealthy. Any mole that starts to change in size (small to bigger or bigger to smaller) should be examined promptly by a board certified dermatologist.     Evolving: Healthy moles tend to "stay the same."  Melanomas may often show signs of change or evolution such as a change in size, shape, color, or elevation. Any mole that starts to itch, bleed, crust, burn, hurt, or ulcerate or demonstrate a change or evolution should be examined promptly by a board certified dermatologist.      Dysplastic Nevi  Dysplastic moles are moles that fit the ABCDE rules of melanoma but are not identified as melanomas when examined under the microscope. They may indicate an increased risk of melanoma in that person. If there is a family history of melanoma, most experts agree that the person may be at an increased risk for developing a melanoma. Experts still do not agree on what dysplastic moles mean in patients without a personal or family history of melanoma. Dysplastic moles are usually larger than common moles and have different colors within it with irregular borders. The appearance can be very similar to a melanoma. Biopsies of dysplastic moles may show abnormalities which are different from a regular mole. Melanoma  Malignant melanoma is a type of skin cancer that can be deadly if it spreads throughout the body. The incidence of melanoma in the [facility] is growing faster than any other cancer.  Melanoma usually grows near the surface of the skin for a period of time, and then begins to grow deeper into the skin. Once it grows deeper into the skin, the risk of spread to other organs greatly increases. Therefore, early detection and removal of a malignant melanoma may result in a better chance at a complete cure; removal after the tumor has spread may not be as effective, leading to worse clinical outcomes such as death. The true rate of nevus transformation into a melanoma is unknown. It has been estimated that the lifetime risk for any acquired melanocytic nevus on any 21year-old individual transforming into melanoma by age 80 is 0.03% (1 in 3,164) for men and 0.009% (1 in 10,800) for women. The appearance of a "new mole" remains one of the most reliable methods for identifying a malignant melanoma. Occasionally, melanomas appear as rapidly growing, blue-black, dome-shaped bumps within a previous mole or previous area of normal skin. Other times, melanomas are suspected when a mole suddenly appears or changes. Itching, burning, or pain in a pigmented lesion should increase suspicion, but most patients with early melanoma have no skin discomfort whatsoever. Melanoma can occur anywhere on the skin, including areas that are difficult for self-examination. Many melanomas are first noticed by other family members. Suspicious-looking moles may be removed for microscopic examination. You may be able to prevent death from melanoma by doing two simple things:    Try to avoid unnecessary sun exposure and protect your skin when it is exposed to the sun. People who live near the equator, people who have intermittent exposures to large amounts of sun, and people who have had sunburns in childhood or adolescence have an increased risk for melanoma. Sun sense and vigilant sun protection may be keys to helping to prevent melanoma.   We recommend wearing UPF-rated sun protective clothing and sunglasses whenever possible and applying a moisturizer-sunscreen combination product (SPF 50+) such as Neutrogena Daily Defense to sun exposed areas of skin at least three times a day. Have your moles regularly examined by a board certified dermatologist AND by yourself or a family member/friend at home. We recommend that you have your moles examined at least once a year by a board certified dermatologist.  Use your birthday as an annual reminder to have your "Birthday Suit" (I.e., your skin) examined; it is a nice birthday gift to yourself to know that your skin is healthy appearing! Additionally, at-home self examinations may be helpful for detecting a possible melanoma. Use the ABCDEs we discussed and check your moles once a month at home. SEBORRHEIC KERATOSIS  A seborrheic keratosis is a harmless warty spot that appears during adult life as a common sign of skin aging. Seborrheic keratoses can arise on any area of skin, covered or uncovered, with the usual exception of the palms and soles. They do not arise from mucous membranes. Seborrheic keratoses can have highly variable appearance. Seborrheic keratoses are extremely common. It has been estimated that over 90% of adults over the age of 61 years have one or more of them. They occur in males and females of all races, typically beginning to erupt in the 35s or 45s. They are uncommon under the age of 21 years. The precise cause of seborrhoeic keratoses is not known. Seborrhoeic keratoses are considered degenerative in nature. As time goes by, seborrheic keratoses tend to become more numerous. Some people inherit a tendency to develop a very large number of them; some people may have hundreds of them. The name "seborrheic keratosis" is misleading, because these lesions are not limited to a seborrhoeic distribution (scalp, mid-face, chest, upper back), nor are they formed from sebaceous glands, nor are they associated with sebum -- which is greasy.   Seborrheic keratosis may also be called "SK," "Seb K," "basal cell papilloma," "senile wart," or "barnacle."      There is no easy way to remove multiple lesions on a single occasion. Unless a specific lesion is "inflamed" and is causing pain or stinging/burning or is bleeding, most insurance companies do not authorize treatment. ANGIOMA ("CHERRY ANGIOMA")  Montoya angiomas markedly increase in number from about the age of 36, so it has been estimated that 75% of people over 76years of age have them. Although they also called "senile angiomas," they can occur in young people too - 5% of adolescents have been found to have them. Cherry angiomas are very common in males and females of any age or race, with no difference in sexes or races affected. They are however more noticeable in white skin than in skin of colour. There may be a family history of similar lesions. Eruptive (very large number appearing in a short period of time) cherry angiomas have been rarely reported to be associated with internal malignancy and pregnancy. ACTINIC KERATOSIS    Actinic keratoses are very common on sites repeatedly exposed to the sun, especially the backs of the hands and the face, most often affecting the ears, nose, cheeks, upper lip, vermilion of the lower lip, temples, forehead and balding scalp. In severely chronically sun-damaged individuals, they may also be found on the upper trunk, upper and lower limbs, and dorsum of feet. We discussed the theoretical premalignant (“pre-cancerous”) nature and etiology of these growths. We discussed the prevailing notion that actinic keratoses are a reflection of abnormal skin cell development due to DNA damage by short wavelength UVB. They are more likely to appear if the immune function is poor, due to aging, recent sun exposure, predisposing disease or certain drugs. We discussed that the main concern is that actinic keratoses may predispose to squamous cell carcinoma.  It is rare for a solitary actinic keratosis to evolve to squamous cell carcinoma (SCC), but the risk of SCC occurring at some stage in a patient with more than 10 actinic keratoses is thought to be about 10 to 15%. A tender, thickened, ulcerated or enlarging actinic keratosis is suspicious of SCC. Actinic keratoses may be prevented by strict sun protection. If already present, keratoses may improve with a very high sun protection factor (50+) broad-spectrum sunscreen applied at least daily to affected areas, year-round. We recommend that UPF-rated clothing and hats and sunglasses be worn whenever possible and that a sunscreen-moisturizer combination product such as Neutrogena Daily Defense be applied at least three times a day. We performed a thorough discussion of treatment options and specific risk/benefits/alternatives including but not limited to medical “field” treatment with medications such as the following:    Topical “field area” medications such as 5-fluorouracil or Aldara (specifically, the trouble with long-term compliance, blistering and local skin reaction versus the convenience of at-home therapy and that field therapy “gets what is not yet seen”). Cryotherapy (specifically, local pain, scarring, dyspigmentation, blistering, possible superinfection, and treats “only what we see” versus directed treatment today). Photodynamic therapy (specifically, local pain, scarring, dyspigmentation, blistering, possible superinfection, need to schedule for a later date, and time spent in the office versus field therapy that “gets what is not yet seen”). BASAL CELL CARCINOMA    What is basal cell carcinoma? Basal cell carcinoma (BCC) is a common, locally invasive, keratinocytic, or non-melanoma, skin cancer. It is also known as rodent ulcer and basalioma. Patients with BCC often develop multiple primary tumours over time. Who gets basal cell carcinoma? Risk factors for BCC include:  Age and sex: BCCs are particularly prevalent in elderly males.  However, they also affect females and younger adults   Previous BCC or other form of skin cancer (squamous cell carcinoma, melanoma)   Sun damage (photoaging, actinic keratoses)   Repeated prior episodes of sunburn   Fair skin, blue eyes and blond or red hair--note; BCC can also affect darker skin types   Previous cutaneous injury, thermal burn, disease (eg cutaneous lupus, sebaceous naevus)   Inherited syndromes: BCC is a particular problem for families with basal cell naevus syndrome (Gorlin syndrome), Lthlf-Qpsté-Dqshedmo syndrome, Rombo syndrome, Oley syndrome and xeroderma pigmentosum   Other risk factors include ionising radiation, exposure to arsenic, and immune suppression due to disease or medicines    What causes basal cell carcinoma? The cause of BCC is multifactorial.  Most often, there are DNA mutations in the patched [facility]) tumour suppressor gene, part of hedgehog signaling pathway   These may be triggered by exposure to ultraviolet radiation   Various spontaneous and inherited gene defects predispose to [facility]    What are the clinical features of basal cell carcinoma? BCC is a locally invasive skin tumour. The main characteristics are:  Slowly growing plaque or nodule   Skin coloured, pink or pigmented   Varies in size from a few millimetres to several centimetres in diameter   Spontaneous bleeding or ulceration  BCC is very rarely a threat to life. A tiny proportion of BCCs grow rapidly, invade deeply, and/or metastasise to local lymph nodes. Types of basal cell carcinoma  There are several distinct clinical types of BCC, and over 20 histological growth patterns of BCC.   Nodular BCC  Most common type of facial BCC   Shiny or pearly nodule with a smooth surface   May have central depression or ulceration, so its edges appear rolled   Blood vessels cross its surface   Cystic variant is soft, with jelly-like contents   Micronodular, microcystic and infiltrative types are potentially aggressive subtypes   Also known as nodulocystic carcinoma  Superficial BCC  Most common type in younger adults   Most common type on upper trunk and shoulders   Slightly scaly, irregular plaque   Thin, translucent rolled border   Multiple microerosions  Morphoeaform BCC  Usually found in mid-facial sites   Waxy, scar-like plaque with indistinct borders   Wide and deep subclinical extension   May infiltrate cutaneous nerves (perineural spread)   Also known as morpheic, morphoeiform or sclerosing BCC  Basosquamous carcinoma  Mixed basal cell carcinoma (BCC) and squamous cell carcinoma (SCC)   Infiltrative growth pattern   Potentially more aggressive than other forms of BCC   Also known as basisquamous carcinoma and mixed basal-squamous cell carcinoma       Complications of basal cell carcinoma    Recurrent BCC  Recurrence of BCC after initial treatment is not uncommon. Characteristics of recurrent BCC often include: Incomplete excision or narrow margins at primary excision   Morphoeic, micronodular, and infiltrative subtypes   Location on head and neck    Advanced BCC  Advanced BCCs are large, often neglected tumours. They may be several centimetres in diameter   They may be deeply infiltrating into tissues below the skin   They are difficult or impossible to treat surgically    Metastatic BCC  Very rare   Primary tumour is often large, neglected or recurrent, located on head and neck, with aggressive subtype   May have had multiple prior treatments   May arise in site exposed to ionising radiation   Can be fatal    How is basal cell carcinoma diagnosed? BCC is diagnosed clinically by the presence of a slowly enlarging skin lesion with typical appearance. The diagnosis and  by a diagnostic biopsy or following excision. Some typical superficial BCCs on trunk and limbs are clinically diagnosed and have non-surgical treatment without histology. What is the treatment for primary basal cell carcinoma?   The treatment for a BCC depends on its type, size and location, the number to be treated, patient factors, and the preference or expertise of the doctor. Most BCCs are treated surgically. Long-term follow-up is recommended to check for new lesions and recurrence; the latter may be unnecessary if histology has reported wide clear margins. Excision biopsy  Excision means the lesion is cut out and the skin stitched up. Most appropriate treatment for nodular, infiltrative and morphoeic BCCs   Should include 3 to 5 mm margin of normal skin around the tumour   Very large lesions may require flap or skin graft to repair the defect   Pathologist will report deep and lateral margins   Further surgery is recommended for lesions that are incompletely excised    Mohs micrographically controlled excision  Mohs micrographically controlled surgery involves examining carefully marked excised tissue under the microscope, layer by layer, to ensure complete excision. Very high cure rates achieved by trained Mohs surgeons   Used in high-risk areas of the face around eyes, lips and nose   Suitable for ill-defined, morphoeic, infiltrative and recurrent subtypes   Large defects are repaired by flap or skin graft    Superficial skin surgery  Superficial skin surgery comprises shave, curettage, and electrocautery. It is a rapid technique using local anaesthesia and does not require sutures. Suitable for small, well-defined nodular or superficial BCCs   Lesions are usually located on trunk or limbs   Wound is left open to heal by secondary intention   Moist wound dressings lead to healing within a few weeks   Eventual scar quality variable    Cryotherapy  Cryotherapy is the treatment of a superficial skin lesion by freezing it, usually with liquid nitrogen.   Suitable for small superficial BCCs on covered areas of trunk and limbs   Best avoided for BCCs on head and neck, and distal to knees   Double freeze-thaw technique   Results in a blister that crusts over and heals within several weeks.   Leaves permanent white rosanne    Photodynamic therapy  Photodynamic therapy (PDT) refers to a technique in which [facility] is treated with a photosensitising chemical, and exposed to light several hours later. Topical photosensitisers include aminolevulinic acid lotion and methyl aminolevulinate cream   Suitable for low-risk small, superficial BCCs   Best avoided if tumour in site at high risk of recurrence   Results in inflammatory reaction, maximal 3-4 days after procedure   Treatment repeated 7 days after initial treatment   Excellent cosmetic results    Imiquimod cream  Imiquimod is an immune response modifier. Best used for superficial BCCs less than 2 cm diameter   Applied three to five times each week, for 6-16 weeks   Results in a variable inflammatory reaction, maximal at three weeks   Minimal scarring is usual    Fluorouracil cream  5-Fluorouracil cream is a topical cytotoxic agent. Used to treat small superficial basal cell carcinomas   Requires prolonged course, eg twice daily for 6-12 weeks   Causes inflammatory reaction   Has high recurrence rates    Radiotherapy  Radiotherapy or X-ray treatment can be used to treat primary BCCs or as adjunctive treatment if margins are incomplete. Mainly used if surgery is not suitable   Best avoided in young patients and in genetic conditions predisposing to skin cancer   Best cosmetic results achieved using multiple fractions   Typically, patient attends once-weekly for several weeks   Causes inflammatory reaction followed by scar   Risk of radiodermatitis, late recurrence, and new tumours    What is the treatment for advanced or metastatic basal cell carcinoma? Locally advanced primary, recurrent or metastatic BCC requires multidisciplinary consultation. Often a combination of treatments is used. Surgery   Radiotherapy   Targeted therapy  Targeted therapy refers to the hedgehog signalling pathway inhibitors, vismodegib and sonidegib.  These drugs have some important risks and side effects. How can basal cell carcinoma be prevented? The most important way to prevent BCC is to avoid sunburn. This is especially important in childhood and early life. Fair skinned individuals and those with a personal or family history of BCC should protect their skin from sun exposure daily, year-round and lifelong. Stay indoors or under the shade in the middle of the day   Wear covering clothing   Apply high protection factor SPF50+ broad-spectrum sunscreens generously to exposed skin if outdoors   Avoid indoor tanning (sun beds, solaria)  Oral nicotinamide (vitamin B3) in a dose of 500 mg twice daily may reduce the number and severity of BCCs. What is the outlook for basal cell carcinoma? Most BCCs are cured by treatment. Cure is most likely if treatment is undertaken when the lesion is small. About 50% of people with BCC develop a second one within 3 years of the first. They are also at increased risk of other skin cancers, especially melanoma. Regular self-skin examinations and long-term annual skin checks by an experienced health professional are recommended. SQUAMOUS CELL CARCINOMA    What is cutaneous squamous cell carcinoma? Cutaneous squamous cell carcinoma (SCC) is a common type of keratinocyte or non-melanoma skin cancer. It is derived from cells within the epidermis that make keratin -- the horny protein that makes up skin, hair and nails. Cutaneous SCC is an invasive disease, referring to cancer cells that have grown beyond the epidermis. SCC can sometimes metastasise and may prove fatal.  Intraepidermal carcinoma (cutaneous SCC in situ) and mucosal SCC are considered elsewhere. Who gets cutaneous squamous cell carcinoma? Risk factors for cutaneous SCC include:  Age and sex: SCCs are particularly prevalent in elderly males. However, they also affect females and younger adults.    Previous SCC or another form of skin cancer (basal cell carcinoma, melanoma) are a strong predictor for further skin cancers. Actinic keratoses   Outdoor occupation or recreation   Smoking   Fair skin, blue eyes and blond or red hair   Previous cutaneous injury, thermal burn, disease (eg cutaneous lupus, epidermolysis bullosa, leg ulcer)   Inherited syndromes: SCC is a particular problem for families with xeroderma pigmentosum and albinism   Other risk factors include ionising radiation, exposure to arsenic, and immune suppression due to disease (eg chronic lymphocytic leukaemia) or medicines. Organ transplant recipients have a massively increased risk of developing SCC. What causes cutaneous squamous cell carcinoma? More than 90% of cases of SCC are associated with numerous DNA mutations in multiple somatic genes. Mutations in the p53 tumour suppressor gene are caused by exposure to ultraviolet radiation (UV), especially UVB (known as signature 7). Other signature mutations relate to cigarette smoking, ageing and immune suppression (eg, to drugs such as azathioprine). Mutations in signalling pathways affect the epidermal growth factor receptor, BLAINE, Fyn, and [id number] signalling. Beta-genus human papillomaviruses (wart virus) are thought to play a role in SCC arising in immune-suppressed populations. ?-HPV and HPV subtypes 5, 8, 17, 20, 24, and 38 have also been associated with an increased risk of cutaneous SCC in immunocompetent individuals. What are the clinical features of cutaneous squamous cell carcinoma? Cutaneous SCCs present as enlarging scaly or crusted lumps. They usually arise within pre-existing actinic keratosis or intraepidermal carcinoma. They grow over weeks to months   They may ulcerate   They are often tender or painful   Located on sun-exposed sites, particularly the face, lips, ears, hands, forearms and lower legs   Size varies from a few millimetres to several centimetres in diameter.     Types of cutaneous squamous cell carcinoma  Distinct clinical types of invasive cutaneous SCC include:  Cutaneous horn -- the horn is due to excessive production of keratin   Keratoacanthoma (KA) -- a rapidly growing keratinising nodule that may resolve without treatment   Carcinoma cuniculatum (‘verrucous carcinoma’), a slow-growing, warty tumour on the sole of the foot. Multiple eruptive SCC/KA-like lesions arising in syndromes, such as multiple self-healing squamous epitheliomas of Carty-Smith and Grzybowski syndrome  The pathologist may classify a tumour as well differentiated, moderately well differentiated, poorly differentiated or anaplastic cutaneous SCC. There are other variants. Classification of squamous cell carcinoma by risk  Cutaneous SCC is classified as low-risk or high-risk, depending on the chance of tumour recurrence and metastasis. Characteristics of high-risk SCC include:  High-risk cutaneous squamous cell carcinoma has the following characteristics:  Diameter greater than or equal to 2 cm   Location on the ear, vermilion of the lip, central face, hands, feet, genitalia   Arising in elderly or immune suppressed patient   Histological thickness greater than 2 mm, poorly differentiated histology, or with the invasion of the subcutaneous tissue, nerves and blood vessels  Metastatic SCC is found in regional lymph nodes (80%), lungs, liver, brain, bones and skin. Staging cutaneous squamous cell carcinoma  In 2011, the [address] on Cancer (AJCC) published a new staging systemic for cutaneous SCC for the 7th Edition of the AJCC manual. This evaluates the dimensions of the original primary tumour (T) and its metastases to lymph nodes (N). Tumour staging for cutaneous SCC  TX: Th Primary tumour cannot be assessed  T0: No evidence of a primary tumour  Tis: Carcinoma in situ  T1: Tumour ? 2cm without high-risk features  T2: Tumour ? 2cm; or; Tumour ?  2 cm with high-risk features  T3: Tumour with the invasion of maxilla, mandible, orbit or temporal bone  T4: Tumour with the invasion of axial or appendicular skeleton or perineural invasion of skull base    Fina staging for cutaneous SCC  NX: Regional lymph nodes cannot be assessed  N0: No regional lymph node metastasis  N1: Metastasis in one local lymph node ? 3cm  N2: Metastasis in one local lymph node ? 3cm; or; Metastasis in >1 local lymph node ? 6cm  N3: Metastasis in lymph node ? 6cm    How is squamous cell carcinoma diagnosed? Diagnosis of cutaneous SCC is based on clinical features. The diagnosis and histological subtype are confirmed pathologically by diagnostic biopsy or following excision. See squamous cell carcinoma - pathology. Patients with high-risk SCC may also undergo staging investigations to determine whether it has spread to lymph nodes or elsewhere. These may include:  Imaging using ultrasound scan, X-rays, CT scans, MRI scans   Lymph node or other tissue biopsies    What is the treatment for cutaneous squamous cell carcinoma? Cutaneous SCC is nearly always treated surgically. Most cases are excised with a 3-10 mm margin of normal tissue around a visible tumour. A flap or skin graft may be needed to repair the defect. Other methods of removal include:  Shave, curettage, and electrocautery for low-risk tumours on trunk and limbs   Aggressive cryotherapy for very small, thin, low-risk tumours   Mohs micrographic surgery for large facial lesions with indistinct margins or recurrent tumours   Radiotherapy for an inoperable tumour, patients unsuitable for surgery, or as adjuvant    What is the treatment for advanced or metastatic squamous cell carcinoma? Locally advanced primary, recurrent or metastatic SCC requires multidisciplinary consultation. Often a combination of treatments is used. Surgery   Radiotherapy   Cemiplimab   Experimental targeted therapy using epidermal growth factor receptor inhibitors    How can cutaneous squamous cell carcinoma be prevented?   There is a great deal of evidence to show that very careful sun protection at any time of life reduces the number of SCCs. This is particularly important in ageing, sun-damaged, fair skin; in patients that are immune suppressed; and in those who already have actinic keratoses or previous SCC. Stay indoors or under the shade in the middle of the day   Wear covering clothing   Apply high protection factor SPF50+ broad-spectrum sunscreens generously to exposed skin if outdoors   Avoid indoor tanning (sun beds, solaria)    Oral nicotinamide (vitamin B3) in a dose of 500 mg twice daily may reduce the number and severity of SCCs in people at high risk. Patients with multiple squamous cell carcinomas may be prescribed an oral retinoid (acitretin or isotretinoin). These reduce the number of tumours but have some nuisance side effects. What is the outlook for cutaneous squamous cell carcinoma? Most SCCs are cured by treatment. A cure is most likely if treatment is undertaken when the lesion is small. The risk of recurrence or disease-associated death is greater for tumours that are > 20 mm in diameter and/or > 2 mm in thickness at the time of surgical excision. About 50% of people at high risk of SCC develop a second one within 5 years of the first. They are also at increased risk of other skin cancers, especially melanoma. Regular self-skin examinations and long-term annual skin checks by an experienced health professional are recommended.

## 2023-11-30 NOTE — PROGRESS NOTES
[facility] Note     Patient Name: Haseeb Mae  Encounter Date: November 30, 2023     Have you been cared for by a [facility]ologist in the last 3 years and, if so, which description applies to you? NO. I am considered a "new" patient and must complete all patient intake questions. I am FEMALE/of child-bearing potential.    REVIEW OF SYSTEMS:  Have you recently had or currently have any of the following? Recent fever or chills? No  Any non-healing wound? No  Are you pregnant or planning to become pregnant? No  Are you currently or planning to be nursing or breast feeding? No   PAST MEDICAL HISTORY:  Have you personally ever had or currently have any of the following? If "YES," then please provide more detail. Skin cancer (such as Melanoma, Basal Cell Carcinoma, Squamous Cell Carcinoma? No  Tuberculosis, HIV/AIDS, Hepatitis B or C: No  Radiation Treatment No   HISTORY OF IMMUNOSUPPRESSION:   Do you have a history of any of the following:  Systemic Immunosuppression such as Diabetes, Biologic or Immunotherapy, Chemotherapy, Organ Transplantation, Bone Marrow Transplantation? No    Answering "YES" requires the addition of the dotphrase "IMMUNOSUPPRESSED" as the first diagnosis of the patient's visit. FAMILY HISTORY:  Any "first degree relatives" (parent, brother, sister, or child) with the following? Skin Cancer, Pancreatic or Other Cancer? Grandmother - Unsure type of skin cancer   PATIENT EXPERIENCE:    Do you want the Dermatologist to perform a COMPLETE skin exam today including a clinical examination under the "bra and underwear" areas? Yes  If necessary, do we have your permission to call and leave a detailed message on your Preferred Phone number that includes your specific medical information?   Yes      Allergies   Allergen Reactions    Sulfa Antibiotics Hives      Current Outpatient Medications:     amphetamine-dextroamphetamine (ADDERALL XR) 30 MG 24 hr capsule, Take 30 mg by mouth daily, Disp: , Rfl:     etonogestrel-ethinyl estradiol (NuvaRing) 0.12-0.015 MG/24HR vaginal ring, Insert vaginally and leave in place for 3 consecutive weeks, then remove for 1 week., Disp: 9 each, Rfl: 4    Fexofenadine HCl (ALLEGRA PO), Take by mouth as needed, Disp: , Rfl:     Probiotic Product (PROBIOTIC DAILY PO), Take by mouth, Disp: , Rfl:     docusate sodium (COLACE) 100 mg capsule, Take 1 capsule (100 mg total) by mouth 2 (two) times a day as needed for constipation (Patient not taking: Reported on 5/16/2022), Disp: , Rfl: 0    Prenatal MV & Min w/FA-DHA (ONE A DAY PRENATAL PO), Take 1 tablet by mouth (Patient not taking: Reported on 11/30/2023), Disp: , Rfl:     valACYclovir (VALTREX) 1,000 mg tablet, Take 1 tablet (1,000 mg total) by mouth daily for 5 days, Disp: 5 tablet, Rfl: 3          Whom besides the patient is providing clinical information about today's encounter? NO ADDITIONAL HISTORIAN (patient alone provided history)    Physical Exam and Assessment/Plan by Diagnosis:    Chief complaint: Patient is a 35 y/o female present for a routine skin exam without personal or family history of skin cancer and has several spots of concern. Pt. states she has several freckles that are either new or growing. MELANOCYTIC NEVI ("Moles")    Physical Exam:  Anatomic Location Affected: Mostly on sun-exposed areas of the trunk and extremities  Morphological Description:  Scattered, 1-4mm round to ovoid, symmetrical-appearing, even bordered, skin colored to dark brown macules/papules, mostly in sun-exposed areas  Pertinent Positives:  Pertinent Negatives:     Additional History of Present Condition:  present on exam    Assessment and Plan:  Based on a thorough discussion of this condition and the management approach to it (including a comprehensive discussion of the known risks, side effects and potential benefits of treatment), the patient (family) agrees to implement the following specific plan:  Provided handout with information regarding the ABCDE's of moles   Recommend routine skin exams every year   Sun avoidance, protective clothing (known as UPF clothing), and the use of at least SPF 30 sunscreens is advised. Sunscreen should be reapplied every two hours when outside. CHERRY ANGIOMAS    Physical Exam:  Anatomic Location Affected:  trunk and extremities  Morphological Description:  Scattered cherry red, 1-4 mm papules. Pertinent Positives:  Pertinent Negatives: Additional History of Present Condition:  Present on exam    Assessment and Plan:  Based on a thorough discussion of this condition and the management approach to it (including a comprehensive discussion of the known risks, side effects and potential benefits of treatment), the patient (family) agrees to implement the following specific plan:  Benign and reassured    Assessment and Plan:    Cherry angioma, also known as [employer] spots, are benign vascular skin lesions. A "cherry angioma" is a firm red, blue or purple papule, 0.1-1 cm in diameter. When thrombosed, they can appear black in colour until evaluated with a dermatoscope when the red or purple colour is more easily seen. Cherry angioma may develop on any part of the body but most often appear on the scalp, face, lips and trunk. An angioma is due to proliferating endothelial cells; these are the cells that line the inside of a blood vessel. Angiomas can arise in early life or later in life; the most common type of angioma is a cherry angioma. Cherry angiomas are very common in males and females of any age or race. They are more noticeable in white skin than in skin of colour. They markedly increase in number from about the age of 36. There may be a family history of similar lesions. Eruptive cherry angiomas have been rarely reported to be associated with internal malignancy. The cause of angiomas is unknown.  Genetic analysis of cherry angiomas has shown that they frequently carry specific somatic missense mutations in the GNAQ and GNA11 (Q209H) genes, which are involved in other vascular and melanocytic proliferations. Cherry angioma is usually diagnosed clinically and no investigations are necessary for the majority of lesions. It has a characteristic red-clod or lobular pattern on dermatoscopy (called lacunar pattern using conventional pattern analysis). When there is uncertainty about the diagnosis, a biopsy may be performed. The angioma is composed of venules in a thickened papillary dermis. Collagen bundles may be prominent between the lobules. Cherry angiomas are harmless, so they do not usually have to be treated. Occasionally, they are removed to exclude a malignant skin lesion such as a nodular melanoma or because they are irritated or bleeding (and a subsequent risk for infection). To decrease friction over the lesions, we recommend Neutrogena Daily Defense SPF 50+ at least 3 times a day.     Scribe Attestation      I,:  Isabella Victoria am acting as a scribe while in the presence of the attending physician.:       I,:  Elza Holley MD personally performed the services described in this documentation    as scribed in my presence.:

## 2023-12-05 ENCOUNTER — APPOINTMENT (EMERGENCY)
Dept: CT IMAGING | Facility: HOSPITAL | Age: 33
End: 2023-12-05
Payer: COMMERCIAL

## 2023-12-05 ENCOUNTER — HOSPITAL ENCOUNTER (EMERGENCY)
Facility: HOSPITAL | Age: 33
Discharge: HOME | End: 2023-12-05
Attending: EMERGENCY MEDICINE
Payer: COMMERCIAL

## 2023-12-05 VITALS
RESPIRATION RATE: 17 BRPM | DIASTOLIC BLOOD PRESSURE: 72 MMHG | TEMPERATURE: 98.3 F | HEART RATE: 63 BPM | OXYGEN SATURATION: 97 % | SYSTOLIC BLOOD PRESSURE: 131 MMHG

## 2023-12-05 DIAGNOSIS — R10.9 ABDOMINAL PAIN: Primary | ICD-10-CM

## 2023-12-05 DIAGNOSIS — R19.7 DIARRHEA: ICD-10-CM

## 2023-12-05 LAB
ALBUMIN SERPL BCP-MCNC: 4.2 G/DL (ref 3.5–5)
ALP SERPL-CCNC: 51 U/L (ref 34–104)
ALT SERPL W P-5'-P-CCNC: 18 U/L (ref 7–52)
ANION GAP SERPL CALCULATED.3IONS-SCNC: 5 MMOL/L
AST SERPL W P-5'-P-CCNC: 17 U/L (ref 13–39)
BASOPHILS # BLD AUTO: 0.04 THOUSANDS/ÂΜL (ref 0–0.1)
BASOPHILS NFR BLD AUTO: 1 % (ref 0–1)
BILIRUB DIRECT SERPL-MCNC: 0.06 MG/DL (ref 0–0.2)
BILIRUB SERPL-MCNC: 0.34 MG/DL (ref 0.2–1)
BILIRUB UR QL STRIP: NEGATIVE
BUN SERPL-MCNC: 11 MG/DL (ref 5–25)
CALCIUM SERPL-MCNC: 9.5 MG/DL (ref 8.4–10.2)
CHLORIDE SERPL-SCNC: 106 MMOL/L (ref 96–108)
CLARITY UR: CLEAR
CO2 SERPL-SCNC: 26 MMOL/L (ref 21–32)
COLOR UR: NORMAL
CREAT SERPL-MCNC: 0.69 MG/DL (ref 0.6–1.3)
EOSINOPHIL # BLD AUTO: 0.13 THOUSAND/ÂΜL (ref 0–0.61)
EOSINOPHIL NFR BLD AUTO: 2 % (ref 0–6)
ERYTHROCYTE [DISTWIDTH] IN BLOOD BY AUTOMATED COUNT: 11.6 % (ref 11.6–15.1)
EXT PREGNANCY TEST URINE: NEGATIVE
EXT. CONTROL: NORMAL
FLUAV RNA RESP QL NAA+PROBE: NEGATIVE
FLUBV RNA RESP QL NAA+PROBE: NEGATIVE
GFR SERPL CREATININE-BSD FRML MDRD: 116 ML/MIN/1.73SQ M
GLUCOSE SERPL-MCNC: 92 MG/DL (ref 65–140)
GLUCOSE UR STRIP-MCNC: NEGATIVE MG/DL
HCT VFR BLD AUTO: 41.6 % (ref 34.8–46.1)
HGB BLD-MCNC: 14 G/DL (ref 11.5–15.4)
HGB UR QL STRIP.AUTO: NEGATIVE
IMM GRANULOCYTES # BLD AUTO: 0.01 THOUSAND/UL (ref 0–0.2)
IMM GRANULOCYTES NFR BLD AUTO: 0 % (ref 0–2)
KETONES UR STRIP-MCNC: NEGATIVE MG/DL
LEUKOCYTE ESTERASE UR QL STRIP: NEGATIVE
LIPASE SERPL-CCNC: 29 U/L (ref 11–82)
LYMPHOCYTES # BLD AUTO: 2.95 THOUSANDS/ÂΜL (ref 0.6–4.47)
LYMPHOCYTES NFR BLD AUTO: 42 % (ref 14–44)
MCH RBC QN AUTO: 32 PG (ref 26.8–34.3)
MCHC RBC AUTO-ENTMCNC: 33.7 G/DL (ref 31.4–37.4)
MCV RBC AUTO: 95 FL (ref 82–98)
MONOCYTES # BLD AUTO: 0.5 THOUSAND/ÂΜL (ref 0.17–1.22)
MONOCYTES NFR BLD AUTO: 7 % (ref 4–12)
NEUTROPHILS # BLD AUTO: 3.41 THOUSANDS/ÂΜL (ref 1.85–7.62)
NEUTS SEG NFR BLD AUTO: 48 % (ref 43–75)
NITRITE UR QL STRIP: NEGATIVE
NRBC BLD AUTO-RTO: 0 /100 WBCS
PH UR STRIP.AUTO: 6.5 [PH]
PLATELET # BLD AUTO: 272 THOUSANDS/UL (ref 149–390)
PMV BLD AUTO: 10.1 FL (ref 8.9–12.7)
POTASSIUM SERPL-SCNC: 4.3 MMOL/L (ref 3.5–5.3)
PROT SERPL-MCNC: 7.4 G/DL (ref 6.4–8.4)
PROT UR STRIP-MCNC: NEGATIVE MG/DL
RBC # BLD AUTO: 4.37 MILLION/UL (ref 3.81–5.12)
RSV RNA RESP QL NAA+PROBE: NEGATIVE
SARS-COV-2 RNA RESP QL NAA+PROBE: NEGATIVE
SODIUM SERPL-SCNC: 137 MMOL/L (ref 135–147)
SP GR UR STRIP.AUTO: 1.01 (ref 1–1.03)
UROBILINOGEN UR STRIP-ACNC: <2 MG/DL
WBC # BLD AUTO: 7.04 THOUSAND/UL (ref 4.31–10.16)

## 2023-12-05 PROCEDURE — 36415 COLL VENOUS BLD VENIPUNCTURE: CPT | Performed by: PHYSICIAN ASSISTANT

## 2023-12-05 PROCEDURE — 99285 EMERGENCY DEPT VISIT HI MDM: CPT | Performed by: EMERGENCY MEDICINE

## 2023-12-05 PROCEDURE — 96374 THER/PROPH/DIAG INJ IV PUSH: CPT

## 2023-12-05 PROCEDURE — 74177 CT ABD & PELVIS W/CONTRAST: CPT

## 2023-12-05 PROCEDURE — G1004 CDSM NDSC: HCPCS

## 2023-12-05 PROCEDURE — 0241U HB NFCT DS VIR RESP RNA 4 TRGT: CPT | Performed by: PHYSICIAN ASSISTANT

## 2023-12-05 PROCEDURE — 96361 HYDRATE IV INFUSION ADD-ON: CPT

## 2023-12-05 PROCEDURE — 80053 COMPREHEN METABOLIC PANEL: CPT | Performed by: PHYSICIAN ASSISTANT

## 2023-12-05 PROCEDURE — 81025 URINE PREGNANCY TEST: CPT | Performed by: PHYSICIAN ASSISTANT

## 2023-12-05 PROCEDURE — 81003 URINALYSIS AUTO W/O SCOPE: CPT | Performed by: PHYSICIAN ASSISTANT

## 2023-12-05 PROCEDURE — 99284 EMERGENCY DEPT VISIT MOD MDM: CPT

## 2023-12-05 PROCEDURE — 85025 COMPLETE CBC W/AUTO DIFF WBC: CPT | Performed by: PHYSICIAN ASSISTANT

## 2023-12-05 PROCEDURE — 82248 BILIRUBIN DIRECT: CPT | Performed by: PHYSICIAN ASSISTANT

## 2023-12-05 PROCEDURE — 83690 ASSAY OF LIPASE: CPT | Performed by: PHYSICIAN ASSISTANT

## 2023-12-05 RX ORDER — DICYCLOMINE HCL 20 MG
20 TABLET ORAL ONCE
Status: COMPLETED | OUTPATIENT
Start: 2023-12-05 | End: 2023-12-05

## 2023-12-05 RX ORDER — DICYCLOMINE HCL 20 MG
20 TABLET ORAL 2 TIMES DAILY
Qty: 20 TABLET | Refills: 0 | Status: SHIPPED | OUTPATIENT
Start: 2023-12-05

## 2023-12-05 RX ORDER — KETOROLAC TROMETHAMINE 30 MG/ML
15 INJECTION, SOLUTION INTRAMUSCULAR; INTRAVENOUS ONCE
Status: COMPLETED | OUTPATIENT
Start: 2023-12-05 | End: 2023-12-05

## 2023-12-05 RX ADMIN — KETOROLAC TROMETHAMINE 15 MG: 30 INJECTION, SOLUTION INTRAMUSCULAR; INTRAVENOUS at 14:47

## 2023-12-05 RX ADMIN — DICYCLOMINE HYDROCHLORIDE 20 MG: 20 TABLET ORAL at 16:24

## 2023-12-05 RX ADMIN — IOHEXOL 100 ML: 350 INJECTION, SOLUTION INTRAVENOUS at 15:11

## 2023-12-05 RX ADMIN — SODIUM CHLORIDE 1000 ML: 0.9 INJECTION, SOLUTION INTRAVENOUS at 14:33

## 2023-12-05 NOTE — ED PROVIDER NOTES
History  Chief Complaint   Patient presents with    Abdominal Pain     Pt reports lower abdominal pain radiating up into upper abdomen since Wednesday last week. Multiple episodes of diarrhea. Oziel Harden is a pleasant and well-appearing 30-year-old female arriving ambulatory to the emergency department for evaluation with chief complaint of abdominal pain and diarrhea. Patient reports that she went out to a restaurant for dinner on Tuesday evening and developed the onset of diarrhea the next day. She states that she is experiencing approximately 6 episodes of diarrhea on a daily basis since time of onset. She notes that her child was also having diarrhea about a week ago but not to this severity. She has not noticed any blood in her stool. She states that more recently she has been experiencing episodes of sharp pain along the lower abdomen radiating up towards the epigastric region which is brought upon by position change and occasionally with meals. She denies any nausea or episodes of vomiting. She denies any fevers, chills, sweats or other systemic symptoms. She states that she was initially concerned about a UTI because of her lower abdominal discomfort however she has not noticed any dysuria, urinary urgency or frequency, malodorous or cloudy urine. She denies any back or flank pain. She has no pertinent surgical history. She denies any recent travel or recent antibiotic use. She offers no other complaints or concerns at this time. Prior to Admission Medications   Prescriptions Last Dose Informant Patient Reported? Taking?    Fexofenadine HCl (ALLEGRA PO)  Self Yes No   Sig: Take by mouth as needed   Prenatal MV & Min w/FA-DHA (ONE A DAY PRENATAL PO)  Self Yes No   Sig: Take 1 tablet by mouth   Patient not taking: Reported on 11/30/2023   Probiotic Product (PROBIOTIC DAILY PO)  Self Yes No   Sig: Take by mouth   amphetamine-dextroamphetamine (ADDERALL XR) 30 MG 24 hr capsule  Self Yes No   Sig: Take 30 mg by mouth daily   docusate sodium (COLACE) 100 mg capsule  Self No No   Sig: Take 1 capsule (100 mg total) by mouth 2 (two) times a day as needed for constipation   Patient not taking: Reported on 5/16/2022   etonogestrel-ethinyl estradiol (NuvaRing) 0.12-0.015 MG/24HR vaginal ring   No No   Sig: Insert vaginally and leave in place for 3 consecutive weeks, then remove for 1 week. valACYclovir (VALTREX) 1,000 mg tablet   No No   Sig: Take 1 tablet (1,000 mg total) by mouth daily for 5 days      Facility-Administered Medications: None       Past Medical History:   Diagnosis Date    Abnormal Pap smear of cervix     Asthma     albuterol as needed    Female infertility     Genital warts     Spouse has history    Herpes     last outbreak 1 year ago    Polycystic ovary syndrome        Past Surgical History:   Procedure Laterality Date    WISDOM TOOTH EXTRACTION         Family History   Problem Relation Age of Onset    No Known Problems Mother     No Known Problems Father     No Known Problems Brother     Cancer Maternal Grandmother         Optical    Diabetes Maternal Grandfather     Heart attack Maternal Grandfather     Alzheimer's disease Paternal Grandmother      I have reviewed and agree with the history as documented. E-Cigarette/Vaping    E-Cigarette Use Never User      E-Cigarette/Vaping Substances     Social History     Tobacco Use    Smoking status: Former     Years: 1.00     Types: Cigarettes    Smokeless tobacco: Never    Tobacco comments:     Socially, stopped 5 yeas ago   Vaping Use    Vaping Use: Never used   Substance Use Topics    Alcohol use: Yes     Comment: socially    Drug use: Never       Review of Systems   Constitutional:  Negative for chills, diaphoresis and fever. Gastrointestinal:  Positive for abdominal pain and diarrhea. Negative for blood in stool, nausea and vomiting. Neurological:  Negative for weakness.    All other systems reviewed and are negative. Physical Exam  Physical Exam  Vitals and nursing note reviewed. Constitutional:       General: She is not in acute distress. Appearance: Normal appearance. She is well-developed. She is not ill-appearing, toxic-appearing or diaphoretic. HENT:      Head: Normocephalic and atraumatic. Right Ear: External ear normal.      Left Ear: External ear normal.   Eyes:      Conjunctiva/sclera: Conjunctivae normal.   Cardiovascular:      Rate and Rhythm: Normal rate and regular rhythm. Pulses: Normal pulses. Pulmonary:      Effort: Pulmonary effort is normal. No respiratory distress. Breath sounds: Normal breath sounds. No wheezing, rhonchi or rales. Chest:      Chest wall: No tenderness. Abdominal:      General: There is no distension. Palpations: Abdomen is soft. Tenderness: There is abdominal tenderness. There is no guarding or rebound. Comments: Normoactive bowel sounds. Abdomen soft, nondistended, with tenderness upon palpation primarily elicited on palpation of the lower abdomen. No peritoneal signs. Musculoskeletal:         General: Normal range of motion. Cervical back: Normal range of motion and neck supple. Skin:     General: Skin is warm and dry. Capillary Refill: Capillary refill takes less than 2 seconds. Neurological:      Mental Status: She is alert. Motor: Motor function is intact. No weakness. Gait: Gait is intact.    Psychiatric:         Mood and Affect: Mood normal.         Vital Signs  ED Triage Vitals [12/05/23 1218]   Temperature Pulse Respirations Blood Pressure SpO2   98.3 °F (36.8 °C) 85 18 154/86 100 %      Temp Source Heart Rate Source Patient Position - Orthostatic VS BP Location FiO2 (%)   Oral Monitor Sitting Left arm --      Pain Score       1           Vitals:    12/05/23 1218 12/05/23 1457   BP: 154/86 131/72   Pulse: 85 63   Patient Position - Orthostatic VS: Sitting Lying         Visual Acuity      ED Medications  Medications   sodium chloride 0.9 % bolus 1,000 mL (0 mL Intravenous Stopped 12/5/23 1645)   ketorolac (TORADOL) injection 15 mg (15 mg Intravenous Given 12/5/23 1447)   iohexol (OMNIPAQUE) 350 MG/ML injection (SINGLE-DOSE) 100 mL (100 mL Intravenous Given 12/5/23 1511)   dicyclomine (BENTYL) tablet 20 mg (20 mg Oral Given 12/5/23 1624)       Diagnostic Studies  Results Reviewed       Procedure Component Value Units Date/Time    FLU/RSV/COVID - if FLU/RSV clinically relevant [[id number]]  (Normal) Collected: 12/05/23 1433    Lab Status: Final result Specimen: Nares from Nose Updated: 12/05/23 1527     SARS-CoV-2 Negative     INFLUENZA A PCR Negative     INFLUENZA B PCR Negative     RSV PCR Negative    Narrative:      FOR PEDIATRIC PATIENTS - copy/paste COVID Guidelines URL to browser: [email or web address]    SARS-CoV-2 assay is a Nucleic Acid Amplification assay intended for the  qualitative detection of nucleic acid from SARS-CoV-2 in nasopharyngeal  swabs. Results are for the presumptive identification of SARS-CoV-2 RNA. Positive results are indicative of infection with SARS-CoV-2, the virus  causing COVID-19, but do not rule out bacterial infection or co-infection  with other viruses. Laboratories within the [facility] and its  territories are required to report all positive results to the appropriate  public health authorities. Negative results do not preclude SARS-CoV-2  infection and should not be used as the sole basis for treatment or other  patient management decisions. Negative results must be combined with  clinical observations, patient history, and epidemiological information. This test has not been FDA cleared or approved. This test has been authorized by FDA under an Emergency Use Authorization  (EUA).  This test is only authorized for the duration of time the  declaration that circumstances exist justifying the authorization of the  emergency use of an in vitro diagnostic tests for detection of SARS-CoV-2  virus and/or diagnosis of COVID-19 infection under section 564(b)(1) of  the Act, 21 U. S.C. 303EAI-5(J)(2), unless the authorization is terminated  or revoked sooner. The test has been validated but independent review by FDA  and CLIA is pending. Test performed using [employer] GeneXpert: This RT-PCR assay targets N2,  a region unique to SARS-CoV-2. A conserved region in the E-gene was chosen  for pan-Sarbecovirus detection which includes SARS-CoV-2. According to CMS-2020-01-R, this platform meets the definition of high-throughput technology.     Comprehensive metabolic panel [[id number]] Collected: 12/05/23 1433    Lab Status: Final result Specimen: Blood from Arm, Left Updated: 12/05/23 1506     Sodium 137 mmol/L      Potassium 4.3 mmol/L      Chloride 106 mmol/L      CO2 26 mmol/L      ANION GAP 5 mmol/L      BUN 11 mg/dL      Creatinine 0.69 mg/dL      Glucose 92 mg/dL      Calcium 9.5 mg/dL      AST 17 U/L      ALT 18 U/L      Alkaline Phosphatase 51 U/L      Total Protein 7.4 g/dL      Albumin 4.2 g/dL      Total Bilirubin 0.34 mg/dL      eGFR 116 ml/min/1.73sq m     Narrative:      [place] guidelines for Chronic Kidney Disease (CKD):     Stage 1 with normal or high GFR (GFR > 90 mL/min/1.73 square meters)    Stage 2 Mild CKD (GFR = 60-89 mL/min/1.73 square meters)    Stage 3A Moderate CKD (GFR = 45-59 mL/min/1.73 square meters)    Stage 3B Moderate CKD (GFR = 30-44 mL/min/1.73 square meters)    Stage 4 Severe CKD (GFR = 15-29 mL/min/1.73 square meters)    Stage 5 End Stage CKD (GFR <15 mL/min/1.73 square meters)  Note: GFR calculation is accurate only with a steady state creatinine    Lipase [[id number]]  (Normal) Collected: 12/05/23 1433    Lab Status: Final result Specimen: Blood from Arm, Left Updated: 12/05/23 1506     Lipase 29 u/L     Bilirubin, direct [[id number]]  (Normal) Collected: 12/05/23 1433    Lab Status: Final result Specimen: Blood from Arm, Left Updated: 12/05/23 1506     Bilirubin, Direct 0.06 mg/dL     UA w Reflex to Microscopic w Reflex to Culture [[id number]] Collected: 12/05/23 1434    Lab Status: Final result Specimen: Urine, Clean Catch Updated: 12/05/23 1458     Color, UA Light Yellow     Clarity, UA Clear     Specific Gravity, UA 1.008     pH, UA 6.5     Leukocytes, UA Negative     Nitrite, UA Negative     Protein, UA Negative mg/dl      Glucose, UA Negative mg/dl      Ketones, UA Negative mg/dl      Urobilinogen, UA <2.0 mg/dl      Bilirubin, UA Negative     Occult Blood, UA Negative    CBC and differential [[id number]] Collected: 12/05/23 1433    Lab Status: Final result Specimen: Blood from Arm, Left Updated: 12/05/23 1448     WBC 7.04 Thousand/uL      RBC 4.37 Million/uL      Hemoglobin 14.0 g/dL      Hematocrit 41.6 %      MCV 95 fL      MCH 32.0 pg      MCHC 33.7 g/dL      RDW 11.6 %      MPV 10.1 fL      Platelets 489 Thousands/uL      nRBC 0 /100 WBCs      Neutrophils Relative 48 %      Immat GRANS % 0 %      Lymphocytes Relative 42 %      Monocytes Relative 7 %      Eosinophils Relative 2 %      Basophils Relative 1 %      Neutrophils Absolute 3.41 Thousands/µL      Immature Grans Absolute 0.01 Thousand/uL      Lymphocytes Absolute 2.95 Thousands/µL      Monocytes Absolute 0.50 Thousand/µL      Eosinophils Absolute 0.13 Thousand/µL      Basophils Absolute 0.04 Thousands/µL     POCT pregnancy, urine [[id number]]  (Normal) Resulted: 12/05/23 1437    Lab Status: Final result Updated: 12/05/23 1437     EXT Preg Test, Ur Negative     Control Valid                   CT abdomen pelvis with contrast   Final Result by Estrellita Garcia MD (12/05 1556)      No inflammatory changes or bowel obstruction.             Workstation performed: [id number]                    Procedures  Procedures         ED Course                               SBIRT 22yo+      Flowsheet Row Most Recent Value Initial Alcohol Screen: US AUDIT-C     1. How often do you have a drink containing alcohol? 2 Filed at: 12/05/2023 1442   2. How many drinks containing alcohol do you have on a typical day you are drinking? 2 Filed at: 12/05/2023 1442   3b. FEMALE Any Age, or MALE 65+: How often do you have 4 or more drinks on one occassion? 0 Filed at: 12/05/2023 1442   Audit-C Score 4 Filed at: 12/05/2023 1442   PORTER: How many times in the past year have you. .. Used an illegal drug or used a prescription medication for non-medical reasons? Never Filed at: 12/05/2023 1442                      Medical Decision Making  This is a 45-year-old female arriving ambulatory to the emergency department for evaluation with approximately 6 days of diarrhea, and more recent onset of abdominal pain. She denies any nausea, vomiting, fevers, chills, sweats. Differential diagnosis includes but is not limited to: Enteritis, colitis, diverticulitis, infectious diarrhea, food poisoning, viral illness; will check labs to evaluate for evidence of electrolyte derangement, renal impairment, urinary tract infection    Initial ED plan: Labs, imaging, UA    Final ED Assessment: Vital signs reviewed on ED presentation, examination as above. All labs and imaging independently reviewed with imaging interpreted by the Radiologist.  There are no significant lab results, no leukocytosis, electrolyte derangements, renal impairment. COVID/flu/RSV negative. CT abdomen/pelvis reports no inflammatory changes or bowel obstruction. Urinalysis bland without evidence of urinary tract infection. All test results reviewed with the patient at bedside. She is reporting improvement in symptoms with treatments given in the emergency department. There were no episodes of diarrhea during ED course.   Recommended fluids as able, and reviewed discharge plan with the patient to include prescription for Bentyl to take as needed for alleviation of abdominal pain and outpatient stool studies to complete when able. Anticipate self-limited course, recommending outpatient gastroenterology follow-up if symptoms are persistent which was included in discharge paperwork. Parameters for ED return reviewed in detail. Patient verbalized understanding and was comfortable and agreeable with plan as above. She was discharged in stable condition and had ambulated independently from the emergency department today without issue. Amount and/or Complexity of Data Reviewed  Labs: ordered. Radiology: ordered. Risk  OTC drugs. Prescription drug management. Disposition  Final diagnoses:   Abdominal pain   Diarrhea     Time reflects when diagnosis was documented in both MDM as applicable and the Disposition within this note       Time User Action Codes Description Comment    12/5/2023  4:12 PM Dariel Norris Add [R10.9] Abdominal pain     12/5/2023  4:12 PM Dariel Norris Add [R19.7] Diarrhea           ED Disposition       ED Disposition   Discharge    Condition   Stable    Date/Time   Tue Dec 5, 2023 [address] [facility] discharge to home/self care.                    Follow-up Information       Follow up With Specialties Details Why Contact Info Additional [address] [facility],  Family Medicine   [address] [place] [address] [id number] [place] [address] [facility] Gastroenterology   [address] [facility] [address]  [address] [facility], [place], [place], [place], [address]     [address] [facility] Emergency Department Emergency Medicine  If symptoms worsen [address] [facility] [id number] [address] [facility] Emergency Department, [facility] [place], [place], [address]            Patient's Medications   Discharge Prescriptions    DICYCLOMINE (BENTYL) 20 MG TABLET    Take 1 tablet (20 mg total) by mouth 2 (two) times a day       Start Date: 12/5/2023 End Date: --       Order Dose: 20 mg       Quantity: 20 tablet    Refills: 0       Outpatient Discharge Orders   Clostridium difficile toxin by PCR with EIA   Standing Status: Future Standing Exp. Date: 12/05/24     Stool Enteric Bacterial Panel by PCR   Standing Status: Future Standing Exp. Date: 12/05/24     Giardia, EIA, Ova/Parasite   Standing Status: Future Standing Exp.  Date: 12/05/24       PDMP Review       None            ED Provider  Electronically Signed by             Irma Duran PA-C  12/05/23 6229

## 2023-12-05 NOTE — DISCHARGE INSTRUCTIONS
Drink plenty of fluids. Dicyclomine as needed for alleviation of abdominal pain. Outpatient stool studies to be completed when able. Outpatient GI follow-up as above, as needed. Please return immediately to the emergency department experiencing worsening symptoms as discussed.

## 2024-06-18 DIAGNOSIS — B00.9 HERPES: ICD-10-CM

## 2024-07-10 RX ORDER — VALACYCLOVIR HYDROCHLORIDE 1 G/1
1000 TABLET, FILM COATED ORAL DAILY
Qty: 5 TABLET | Refills: 3 | Status: SHIPPED | OUTPATIENT
Start: 2024-07-10 | End: 2024-07-11 | Stop reason: SDUPTHER

## 2024-07-11 DIAGNOSIS — B00.9 HERPES: ICD-10-CM

## 2024-07-11 RX ORDER — VALACYCLOVIR HYDROCHLORIDE 1 G/1
1000 TABLET, FILM COATED ORAL DAILY
Qty: 5 TABLET | Refills: 0 | Status: SHIPPED | OUTPATIENT
Start: 2024-07-11 | End: 2024-07-16

## 2025-02-05 ENCOUNTER — TELEPHONE (OUTPATIENT)
Age: 35
End: 2025-02-05

## 2025-03-12 NOTE — PROGRESS NOTES
"  Cancer-related family history includes Cancer in her maternal grandmother.Name: Abiola Ramirez      : 1990      MRN: [id number]  Encounter Provider: Elida Vyas PA-C  Encounter Date: 3/13/2025   Encounter department: [facility] [place]  :  Assessment & Plan  Encounter for annual routine gynecological examination    Pap with high risk HPV testing every 5 years, if normal.   Sexually transmitted infection testing as indicated.  Exercise most days of week-minimum of 150-300 minutes per week.   Obtain appropriate diet and hydration.   Calcium 1000mg (in divided doses-max 600 mg at one time) + 600 vit D daily.  Kegels 20 times twice daily.   Call your insurance company to verify coverage prior to completing any ordered tests.   Return to office in one year or sooner, if needed.       Mass of left breast, unspecified quadrant  Very small mobile smooth round mass, non-tender 4:00 near the axilla  Diagnostic mammogram ordered  monthly breast self exam recommended.   We reviewed the signs and symptoms of breast cancer advised her to call if she experiences them  Orders:    Mammo diagnostic left w 3d and cad; Future    PCOS (polycystic ovarian syndrome)    Continue nuvaring  Birth control refill not needed. Take as directed (ACHES reviewed). Benefits, risks and alternatives discussed/reviewed.         History of Present Illness   HPI  Abiola Ramirez is a 34 y.o. female who presents annual examination with no concerns      LMP 3/7/25 light, predictable  Sexually active Yes Monogamous   Birth control Yes - NuvaRing  Desiring pregnancy in the next year No  History of abnormal pap: Yes, \"when I was 17\" okay since  Last pap 2022 , Findings NILM/HPV neg , Next pap:   Self breast exam Yes  HPV vaccine series completed: No    Hereditary Cancer Screening  FH Breast/Ovarian cancer: none  FH Uterine cancer: none  FH colon cancer: none    Review of Systems "   Constitutional:  Negative for chills, fatigue, fever and unexpected weight change.   Gastrointestinal:  Negative for abdominal pain, anal bleeding, blood in stool, constipation, diarrhea, nausea and vomiting.   Endocrine: Negative for cold intolerance and heat intolerance.   Genitourinary:  Negative for difficulty urinating, dyspareunia, dysuria, frequency, hematuria, menstrual problem, pelvic pain, urgency, vaginal bleeding, vaginal discharge and vaginal pain.   Neurological:  Negative for dizziness, syncope, light-headedness and headaches.   Psychiatric/Behavioral:  Negative for dysphoric mood. The patient is not nervous/anxious.      Current Outpatient Medications on File Prior to Visit   Medication Sig Dispense Refill    amphetamine-dextroamphetamine (ADDERALL XR) 30 MG 24 hr capsule Take 30 mg by mouth daily      etonogestrel-ethinyl estradiol (NuvaRing) 0.12-0.015 MG/24HR vaginal ring Insert vaginally and leave in place for 3 consecutive weeks, then remove for 1 week. 9 each 4    multivitamin (THERAGRAN) TABS Take 1 tablet by mouth daily      Probiotic Product (PROBIOTIC DAILY PO) Take by mouth      Tirzepatide-Weight Management (Zepbound) 5 MG/0.5ML SOLN Inject under the skin      [DISCONTINUED] dicyclomine (BENTYL) 20 mg tablet Take 1 tablet (20 mg total) by mouth 2 (two) times a day 20 tablet 0    Fexofenadine HCl (ALLEGRA PO) Take by mouth as needed      valACYclovir (VALTREX) 1,000 mg tablet Take 1 tablet (1,000 mg total) by mouth daily for 5 days 5 tablet 0    [DISCONTINUED] docusate sodium (COLACE) 100 mg capsule Take 1 capsule (100 mg total) by mouth 2 (two) times a day as needed for constipation (Patient not taking: Reported on 5/16/2022)  0    [DISCONTINUED] Prenatal MV & Min w/FA-DHA (ONE A DAY PRENATAL PO) Take 1 tablet by mouth (Patient not taking: Reported on 11/30/2023)       No current facility-administered medications on file prior to visit.      Social History     Tobacco Use    Smoking  status: Former     Types: Cigarettes    Smokeless tobacco: Never    Tobacco comments:     Socially, stopped 5 yeas ago   Vaping Use    Vaping status: Never Used   Substance and Sexual Activity    Alcohol use: Yes     Comment: socially    Drug use: Never    Sexual activity: Yes     Partners: Male        Objective   /76 (BP Location: Left arm, Patient Position: Sitting, Cuff Size: Standard)   Wt 79.7 kg (175 lb 9.6 oz)   BMI 29.22 kg/m²      Physical Exam  Vitals and nursing note reviewed.   Constitutional:       General: She is not in acute distress.     Appearance: She is well-developed.   HENT:      Head: Normocephalic and atraumatic.   Eyes:      Extraocular Movements: Extraocular movements intact.   Pulmonary:      Effort: Pulmonary effort is normal.   Chest:   Breasts:     Right: No swelling, bleeding, inverted nipple, mass, nipple discharge, skin change or tenderness.      Left: Mass present. No swelling, bleeding, inverted nipple, nipple discharge, skin change or tenderness.          Comments: Very small, mobile, smooth round, non-tender mass  Abdominal:      Palpations: Abdomen is soft.      Tenderness: There is no abdominal tenderness.      Hernia: There is no hernia in the left inguinal area or right inguinal area.   Genitourinary:     General: Normal vulva.      Exam position: Lithotomy position.      Pubic Area: No rash.       Labia:         Right: No rash, tenderness or lesion.         Left: No rash, tenderness or lesion.       Urethra: No urethral pain or urethral swelling.      Vagina: No vaginal discharge, erythema, tenderness, bleeding or lesions.      Cervix: No cervical motion tenderness, discharge, friability, lesion, erythema or cervical bleeding.      Uterus: Not enlarged and not tender.       Adnexa:         Right: No mass, tenderness or fullness.          Left: No mass, tenderness or fullness.     Lymphadenopathy:      Upper Body:      Right upper body: No supraclavicular, axillary or  pectoral adenopathy.      Left upper body: No supraclavicular, axillary or pectoral adenopathy.      Lower Body: No right inguinal adenopathy. No left inguinal adenopathy.   Skin:     General: Skin is warm and dry.   Neurological:      Mental Status: She is alert.   Psychiatric:         Mood and Affect: Mood normal.         Behavior: Behavior normal.         Elida Vyas PA-C

## 2025-03-13 ENCOUNTER — ANNUAL EXAM (OUTPATIENT)
Age: 35
End: 2025-03-13
Payer: COMMERCIAL

## 2025-03-13 VITALS — BODY MASS INDEX: 29.22 KG/M2 | WEIGHT: 175.6 LBS | SYSTOLIC BLOOD PRESSURE: 118 MMHG | DIASTOLIC BLOOD PRESSURE: 76 MMHG

## 2025-03-13 DIAGNOSIS — N63.20 MASS OF LEFT BREAST, UNSPECIFIED QUADRANT: ICD-10-CM

## 2025-03-13 DIAGNOSIS — E28.2 PCOS (POLYCYSTIC OVARIAN SYNDROME): ICD-10-CM

## 2025-03-13 DIAGNOSIS — Z01.419 ENCOUNTER FOR ANNUAL ROUTINE GYNECOLOGICAL EXAMINATION: Primary | ICD-10-CM

## 2025-03-13 PROCEDURE — S0612 ANNUAL GYNECOLOGICAL EXAMINA: HCPCS

## 2025-03-13 RX ORDER — DIPHENOXYLATE HYDROCHLORIDE AND ATROPINE SULFATE 2.5; .025 MG/1; MG/1
1 TABLET ORAL DAILY
COMMUNITY

## 2025-03-13 RX ORDER — TIRZEPATIDE 5 MG/.5ML
INJECTION, SOLUTION SUBCUTANEOUS
COMMUNITY

## 2025-03-13 NOTE — ASSESSMENT & PLAN NOTE
Continue nuvaring  Birth control refill not needed. Take as directed (ACHES reviewed). Benefits, risks and alternatives discussed/reviewed.

## 2025-03-27 ENCOUNTER — TELEPHONE (OUTPATIENT)
Age: 35
End: 2025-03-27

## 2025-03-27 NOTE — TELEPHONE ENCOUNTER
Fax number  came back as busy called [phone] and spoke with Krystal and she gave phone number [phone] was successfully sent.    Fax confirmation in .

## 2025-03-27 NOTE — TELEPHONE ENCOUNTER
[facility] from  [facility] called and patient has her appointment on Monday for her mammogram and they asked if possible can it please be faxed to them today at fax .  If there are any questions they can be reached at [phone] . Thank you

## 2025-03-31 ENCOUNTER — RESULTS FOLLOW-UP (OUTPATIENT)
Dept: OBGYN CLINIC | Facility: CLINIC | Age: 35
End: 2025-03-31